# Patient Record
Sex: FEMALE | Race: WHITE | NOT HISPANIC OR LATINO
[De-identification: names, ages, dates, MRNs, and addresses within clinical notes are randomized per-mention and may not be internally consistent; named-entity substitution may affect disease eponyms.]

---

## 2019-01-23 ENCOUNTER — APPOINTMENT (OUTPATIENT)
Dept: INTERNAL MEDICINE | Facility: CLINIC | Age: 44
End: 2019-01-23

## 2019-02-25 ENCOUNTER — TRANSCRIPTION ENCOUNTER (OUTPATIENT)
Age: 44
End: 2019-02-25

## 2019-04-19 ENCOUNTER — TRANSCRIPTION ENCOUNTER (OUTPATIENT)
Age: 44
End: 2019-04-19

## 2019-05-22 ENCOUNTER — TRANSCRIPTION ENCOUNTER (OUTPATIENT)
Age: 44
End: 2019-05-22

## 2019-10-22 ENCOUNTER — TRANSCRIPTION ENCOUNTER (OUTPATIENT)
Age: 44
End: 2019-10-22

## 2020-09-29 ENCOUNTER — TRANSCRIPTION ENCOUNTER (OUTPATIENT)
Age: 45
End: 2020-09-29

## 2020-10-27 ENCOUNTER — TRANSCRIPTION ENCOUNTER (OUTPATIENT)
Age: 45
End: 2020-10-27

## 2020-10-30 ENCOUNTER — APPOINTMENT (OUTPATIENT)
Dept: INTERNAL MEDICINE | Facility: CLINIC | Age: 45
End: 2020-10-30

## 2020-11-25 ENCOUNTER — APPOINTMENT (OUTPATIENT)
Dept: INTERNAL MEDICINE | Facility: CLINIC | Age: 45
End: 2020-11-25
Payer: COMMERCIAL

## 2020-11-25 ENCOUNTER — NON-APPOINTMENT (OUTPATIENT)
Age: 45
End: 2020-11-25

## 2020-11-25 VITALS
SYSTOLIC BLOOD PRESSURE: 132 MMHG | WEIGHT: 253 LBS | OXYGEN SATURATION: 98 % | HEIGHT: 68 IN | DIASTOLIC BLOOD PRESSURE: 90 MMHG | TEMPERATURE: 97.8 F | HEART RATE: 84 BPM | BODY MASS INDEX: 38.34 KG/M2

## 2020-11-25 DIAGNOSIS — Z11.59 ENCOUNTER FOR SCREENING FOR OTHER VIRAL DISEASES: ICD-10-CM

## 2020-11-25 DIAGNOSIS — Z97.5 PRESENCE OF (INTRAUTERINE) CONTRACEPTIVE DEVICE: ICD-10-CM

## 2020-11-25 DIAGNOSIS — Z63.5 DISRUPTION OF FAMILY BY SEPARATION AND DIVORCE: ICD-10-CM

## 2020-11-25 DIAGNOSIS — Z13.1 ENCOUNTER FOR SCREENING FOR DIABETES MELLITUS: ICD-10-CM

## 2020-11-25 DIAGNOSIS — Z13.0 ENCOUNTER FOR SCREENING FOR DISEASES OF THE BLOOD AND BLOOD-FORMING ORGANS AND CERTAIN DISORDERS INVOLVING THE IMMUNE MECHANISM: ICD-10-CM

## 2020-11-25 DIAGNOSIS — Z13.220 ENCOUNTER FOR SCREENING FOR LIPOID DISORDERS: ICD-10-CM

## 2020-11-25 DIAGNOSIS — Z13.21 ENCOUNTER FOR SCREENING FOR NUTRITIONAL DISORDER: ICD-10-CM

## 2020-11-25 DIAGNOSIS — Z11.4 ENCOUNTER FOR SCREENING FOR HUMAN IMMUNODEFICIENCY VIRUS [HIV]: ICD-10-CM

## 2020-11-25 PROCEDURE — G0442 ANNUAL ALCOHOL SCREEN 15 MIN: CPT

## 2020-11-25 PROCEDURE — G0447 BEHAVIOR COUNSEL OBESITY 15M: CPT

## 2020-11-25 PROCEDURE — 90715 TDAP VACCINE 7 YRS/> IM: CPT

## 2020-11-25 PROCEDURE — 99386 PREV VISIT NEW AGE 40-64: CPT | Mod: 25

## 2020-11-25 PROCEDURE — 93000 ELECTROCARDIOGRAM COMPLETE: CPT | Mod: 59

## 2020-11-25 PROCEDURE — 99214 OFFICE O/P EST MOD 30 MIN: CPT | Mod: 25

## 2020-11-25 PROCEDURE — 36415 COLL VENOUS BLD VENIPUNCTURE: CPT

## 2020-11-25 PROCEDURE — G0444 DEPRESSION SCREEN ANNUAL: CPT

## 2020-11-25 PROCEDURE — 90471 IMMUNIZATION ADMIN: CPT

## 2020-11-25 RX ORDER — LEVONORGESTREL 52 MG/1
INTRAUTERINE DEVICE INTRAUTERINE
Refills: 0 | Status: ACTIVE | COMMUNITY

## 2020-11-25 SDOH — SOCIAL STABILITY - SOCIAL INSECURITY: DISRUPTION OF FAMILY BY SEPARATION AND DIVORCE: Z63.5

## 2020-11-25 NOTE — ASSESSMENT
[FreeTextEntry1] : Plan as above.\par \par The patient was advised to call for any problems or questions.\par \par We will make contact and follow-up after the patient's Cardiologic evaluation, which she will arrange with her father's cardiologist.

## 2020-11-25 NOTE — PHYSICAL EXAM
[No Acute Distress] : no acute distress [Well Nourished] : well nourished [Well Developed] : well developed [Well-Appearing] : well-appearing [Normal Sclera/Conjunctiva] : normal sclera/conjunctiva [EOMI] : extraocular movements intact [Normal Outer Ear/Nose] : the outer ears and nose were normal in appearance [No JVD] : no jugular venous distention [No Lymphadenopathy] : no lymphadenopathy [Supple] : supple [Thyroid Normal, No Nodules] : the thyroid was normal and there were no nodules present [No Respiratory Distress] : no respiratory distress  [No Accessory Muscle Use] : no accessory muscle use [Clear to Auscultation] : lungs were clear to auscultation bilaterally [Normal Percussion] : the chest was normal to percussion [Normal Rate] : normal rate  [Regular Rhythm] : with a regular rhythm [Normal S1, S2] : normal S1 and S2 [No Murmur] : no murmur heard [No Varicosities] : no varicosities [Pedal Pulses Present] : the pedal pulses are present [No Edema] : there was no peripheral edema [No Palpable Aorta] : no palpable aorta [No Extremity Clubbing/Cyanosis] : no extremity clubbing/cyanosis [Normal Appearance] : normal in appearance [No Nipple Discharge] : no nipple discharge [No Axillary Lymphadenopathy] : no axillary lymphadenopathy [Soft] : abdomen soft [Non Tender] : non-tender [Non-distended] : non-distended [No Masses] : no abdominal mass palpated [No HSM] : no HSM [Normal Bowel Sounds] : normal bowel sounds [No Hernias] : no hernias [Normal Supraclavicular Nodes] : no supraclavicular lymphadenopathy [Normal Axillary Nodes] : no axillary lymphadenopathy [Normal Posterior Cervical Nodes] : no posterior cervical lymphadenopathy [Normal Anterior Cervical Nodes] : no anterior cervical lymphadenopathy [Normal Inguinal Nodes] : no inguinal lymphadenopathy [Normal Femoral Nodes] : no femoral lymphadenopathy [No CVA Tenderness] : no CVA  tenderness [No Spinal Tenderness] : no spinal tenderness [No Joint Swelling] : no joint swelling [Grossly Normal Strength/Tone] : grossly normal strength/tone [No Rash] : no rash [Coordination Grossly Intact] : coordination grossly intact [No Focal Deficits] : no focal deficits [Normal Gait] : normal gait [Speech Grossly Normal] : speech grossly normal [Memory Grossly Normal] : memory grossly normal [Normal Affect] : the affect was normal [Alert and Oriented x3] : oriented to person, place, and time [Normal Mood] : the mood was normal [Normal Insight/Judgement] : insight and judgment were intact [de-identified] : Tattoo left wrist.  Resolving ecchymosis left shin.

## 2020-11-25 NOTE — HISTORY OF PRESENT ILLNESS
[FreeTextEntry1] : 45-year-old female here to establish care, for annual comprehensive evaluation and because of concerns about high blood pressure. [de-identified] : The patient is concerned about her blood pressure, which in the past has been in the high range or actually elevated.  She has attributed this in the past to anxiety, for which she is taking escitalopram with good effect.  She is under a lot of stress because she is a single mother whose father was recently diagnosed with brain cancer and his mother-in-law was also recently diagnosed with cancer.  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  She lives with her 10-year-old son and neither of them has had any coronavirus symptoms since the beginning of the pandemic.

## 2020-11-25 NOTE — HEALTH RISK ASSESSMENT
[0-5] : 0-5 [No] : In the past 12 months have you used drugs other than those required for medical reasons? No [No falls in past year] : Patient reported no falls in the past year [0] : 2) Feeling down, depressed, or hopeless: Not at all (0) [Patient reported mammogram was normal] : Patient reported mammogram was normal [Patient reported PAP Smear was normal] : Patient reported PAP Smear was normal [HIV Test offered] : HIV Test offered [Hepatitis C test offered] : Hepatitis C test offered [Fully functional (bathing, dressing, toileting, transferring, walking, feeding)] : Fully functional (bathing, dressing, toileting, transferring, walking, feeding) [Fully functional (using the telephone, shopping, preparing meals, housekeeping, doing laundry, using] : Fully functional and needs no help or supervision to perform IADLs (using the telephone, shopping, preparing meals, housekeeping, doing laundry, using transportation, managing medications and managing finances) [] : No [Audit-CScore] : 0 [[id number]] : 0 [MammogramDate] : 12/19 [PapSmearDate] : 12/19

## 2020-11-27 LAB
25(OH)D3 SERPL-MCNC: 17 NG/ML
ALBUMIN SERPL ELPH-MCNC: 4.2 G/DL
ALP BLD-CCNC: 128 U/L
ALT SERPL-CCNC: 9 U/L
ANION GAP SERPL CALC-SCNC: 8 MMOL/L
AST SERPL-CCNC: 13 U/L
BASOPHILS # BLD AUTO: 0.05 K/UL
BASOPHILS NFR BLD AUTO: 0.7 %
BILIRUB SERPL-MCNC: 0.3 MG/DL
BUN SERPL-MCNC: 10 MG/DL
CALCIUM SERPL-MCNC: 9.6 MG/DL
CHLORIDE SERPL-SCNC: 103 MMOL/L
CHOLEST SERPL-MCNC: 177 MG/DL
CO2 SERPL-SCNC: 28 MMOL/L
CREAT SERPL-MCNC: 0.73 MG/DL
EOSINOPHIL # BLD AUTO: 0.09 K/UL
EOSINOPHIL NFR BLD AUTO: 1.3 %
ESTIMATED AVERAGE GLUCOSE: 114 MG/DL
GLUCOSE SERPL-MCNC: 88 MG/DL
HBA1C MFR BLD HPLC: 5.6 %
HCT VFR BLD CALC: 36.8 %
HCV AB SER QL: NONREACTIVE
HCV S/CO RATIO: 0.11 S/CO
HDLC SERPL-MCNC: 41 MG/DL
HGB BLD-MCNC: 11 G/DL
HIV1+2 AB SPEC QL IA.RAPID: NONREACTIVE
IMM GRANULOCYTES NFR BLD AUTO: 0.1 %
IRON SATN MFR SERPL: 17 %
IRON SERPL-MCNC: 64 UG/DL
LDLC SERPL CALC-MCNC: 115 MG/DL
LYMPHOCYTES # BLD AUTO: 1.87 K/UL
LYMPHOCYTES NFR BLD AUTO: 26.2 %
MAN DIFF?: NORMAL
MCHC RBC-ENTMCNC: 26.1 PG
MCHC RBC-ENTMCNC: 29.9 GM/DL
MCV RBC AUTO: 87.2 FL
MONOCYTES # BLD AUTO: 0.4 K/UL
MONOCYTES NFR BLD AUTO: 5.6 %
NEUTROPHILS # BLD AUTO: 4.72 K/UL
NEUTROPHILS NFR BLD AUTO: 66.1 %
NONHDLC SERPL-MCNC: 135 MG/DL
PLATELET # BLD AUTO: 327 K/UL
POTASSIUM SERPL-SCNC: 3.9 MMOL/L
PROT SERPL-MCNC: 7.3 G/DL
RBC # BLD: 4.22 M/UL
RBC # FLD: 14 %
SARS-COV-2 IGG SERPL IA-ACNC: 0.08 INDEX
SARS-COV-2 IGG SERPL QL IA: NEGATIVE
SODIUM SERPL-SCNC: 138 MMOL/L
TIBC SERPL-MCNC: 370 UG/DL
TRIGL SERPL-MCNC: 102 MG/DL
TSH SERPL-ACNC: 0.81 UIU/ML
UIBC SERPL-MCNC: 306 UG/DL
WBC # FLD AUTO: 7.14 K/UL

## 2020-12-03 ENCOUNTER — TRANSCRIPTION ENCOUNTER (OUTPATIENT)
Age: 45
End: 2020-12-03

## 2020-12-08 ENCOUNTER — APPOINTMENT (OUTPATIENT)
Dept: CARDIOLOGY | Facility: CLINIC | Age: 45
End: 2020-12-08
Payer: COMMERCIAL

## 2020-12-08 ENCOUNTER — NON-APPOINTMENT (OUTPATIENT)
Age: 45
End: 2020-12-08

## 2020-12-08 VITALS
WEIGHT: 253 LBS | SYSTOLIC BLOOD PRESSURE: 130 MMHG | BODY MASS INDEX: 38.34 KG/M2 | HEIGHT: 68 IN | DIASTOLIC BLOOD PRESSURE: 82 MMHG | HEART RATE: 67 BPM

## 2020-12-08 DIAGNOSIS — R00.0 TACHYCARDIA, UNSPECIFIED: ICD-10-CM

## 2020-12-08 PROCEDURE — 99244 OFF/OP CNSLTJ NEW/EST MOD 40: CPT

## 2020-12-08 PROCEDURE — 99072 ADDL SUPL MATRL&STAF TM PHE: CPT

## 2020-12-08 PROCEDURE — 93000 ELECTROCARDIOGRAM COMPLETE: CPT

## 2020-12-08 NOTE — ASSESSMENT
[FreeTextEntry1] : 46 y/o F with borderline HTN\par \par Recommend Antianxiety medication\par No obvious indication for beta blockers at this time\par Blood pressure diary\par Will suggest 2-D echocardiogram if [facility] will start antihypertensive medications\par Recommended chemical stress test to evaluate for ischemia as well as peak blood pressures\par Followup in one month

## 2020-12-08 NOTE — HISTORY OF PRESENT ILLNESS
[FreeTextEntry1] : 45-year-old female with significant anxiety in her life secondary to a father who is ill as well as stress of school presents with borderline hypertension\par Patient denies chest pain but has intermittent shortness of breath on exertion. She denies history of myocardial infarction, stroke, coronary intervention, arrhythmia. She denies any syncope or palpitations.She is on Lexapro for anxiety

## 2020-12-08 NOTE — PHYSICAL EXAM
[General Appearance - Well Developed] : well developed [Normal Appearance] : normal appearance [Well Groomed] : well groomed [General Appearance - Well Nourished] : well nourished [No Deformities] : no deformities [General Appearance - In No Acute Distress] : no acute distress [Normal Conjunctiva] : the conjunctiva exhibited no abnormalities [Eyelids - No Xanthelasma] : the eyelids demonstrated no xanthelasmas [Normal Oral Mucosa] : normal oral mucosa [No Oral Pallor] : no oral pallor [No Oral Cyanosis] : no oral cyanosis [Normal Jugular Venous A Waves Present] : normal jugular venous A waves present [Normal Jugular Venous V Waves Present] : normal jugular venous V waves present [No Jugular Venous Brandon A Waves] : no jugular venous brandon A waves [Heart Rate And Rhythm] : heart rate and rhythm were normal [Heart Sounds] : normal S1 and S2 [Murmurs] : no murmurs present [Respiration, Rhythm And Depth] : normal respiratory rhythm and effort [Exaggerated Use Of Accessory Muscles For Inspiration] : no accessory muscle use [Auscultation Breath Sounds / Voice Sounds] : lungs were clear to auscultation bilaterally [Abdomen Soft] : soft [Abdomen Tenderness] : non-tender [Abdomen Mass (___ Cm)] : no abdominal mass palpated [Abnormal Walk] : normal gait [Gait - Sufficient For Exercise Testing] : the gait was sufficient for exercise testing [Nail Clubbing] : no clubbing of the fingernails [Cyanosis, Localized] : no localized cyanosis [Petechial Hemorrhages (___cm)] : no petechial hemorrhages [Skin Color & Pigmentation] : normal skin color and pigmentation [] : no rash [No Venous Stasis] : no venous stasis [Skin Lesions] : no skin lesions [No Skin Ulcers] : no skin ulcer [No Xanthoma] : no  xanthoma was observed [Oriented To Time, Place, And Person] : oriented to person, place, and time [Affect] : the affect was normal [Mood] : the mood was normal [No Anxiety] : not feeling anxious

## 2020-12-23 ENCOUNTER — NON-APPOINTMENT (OUTPATIENT)
Age: 45
End: 2020-12-23

## 2021-01-05 ENCOUNTER — RESULT REVIEW (OUTPATIENT)
Age: 46
End: 2021-01-05

## 2021-02-02 ENCOUNTER — APPOINTMENT (OUTPATIENT)
Dept: CARDIOLOGY | Facility: CLINIC | Age: 46
End: 2021-02-02
Payer: COMMERCIAL

## 2021-02-02 PROCEDURE — 99213 OFFICE O/P EST LOW 20 MIN: CPT | Mod: 95

## 2021-02-02 NOTE — HISTORY OF PRESENT ILLNESS
[FreeTextEntry1] : 45-year-old female with significant anxiety in her life secondary to a father who is ill as well as stress of school presents with borderline hypertension\par Patient denies chest pain but has intermittent shortness of breath on exertion. She denies history of myocardial infarction, stroke, coronary intervention, arrhythmia. She denies any syncope or palpitations.She is on Lexapro for anxiety\par \par Since last visit - no new complaints echo and stress normal.

## 2021-02-02 NOTE — ASSESSMENT
[FreeTextEntry1] : 44 y/o F with borderline HTN\par \par Recommend Antianxiety medication\par No obvious indication for beta blockers at this time\par Blood pressure diary\par 2d echo normal EF and wall thickness\par TST - no ischemia based on ST response\par Followup in 6-12 months\par \par Followup patient\par Reason patient request contact:\par Conference took place on video conference on [employer]\par Consent was obtained from patient\par Time spent: 25 minutes > 50% of the time in the encounter in both counseling and coordination of care for any or all of the diagnosis submitted\par \par

## 2021-02-02 NOTE — PHYSICAL EXAM
[General Appearance - Well Developed] : well developed [Normal Appearance] : normal appearance [Well Groomed] : well groomed [General Appearance - Well Nourished] : well nourished [No Deformities] : no deformities [General Appearance - In No Acute Distress] : no acute distress [Normal Conjunctiva] : the conjunctiva exhibited no abnormalities [Eyelids - No Xanthelasma] : the eyelids demonstrated no xanthelasmas [Normal Oral Mucosa] : normal oral mucosa [No Oral Pallor] : no oral pallor [No Oral Cyanosis] : no oral cyanosis [Normal Jugular Venous A Waves Present] : normal jugular venous A waves present [Normal Jugular Venous V Waves Present] : normal jugular venous V waves present [No Jugular Venous Brandon A Waves] : no jugular venous brandon A waves [Respiration, Rhythm And Depth] : normal respiratory rhythm and effort [Exaggerated Use Of Accessory Muscles For Inspiration] : no accessory muscle use [Auscultation Breath Sounds / Voice Sounds] : lungs were clear to auscultation bilaterally [Heart Rate And Rhythm] : heart rate and rhythm were normal [Heart Sounds] : normal S1 and S2 [Murmurs] : no murmurs present [Abdomen Soft] : soft [Abdomen Tenderness] : non-tender [Abdomen Mass (___ Cm)] : no abdominal mass palpated [Abnormal Walk] : normal gait [Gait - Sufficient For Exercise Testing] : the gait was sufficient for exercise testing [Nail Clubbing] : no clubbing of the fingernails [Cyanosis, Localized] : no localized cyanosis [Petechial Hemorrhages (___cm)] : no petechial hemorrhages [] : no rash [Skin Color & Pigmentation] : normal skin color and pigmentation [No Venous Stasis] : no venous stasis [Skin Lesions] : no skin lesions [No Skin Ulcers] : no skin ulcer [No Xanthoma] : no  xanthoma was observed [Oriented To Time, Place, And Person] : oriented to person, place, and time [Affect] : the affect was normal [Mood] : the mood was normal [No Anxiety] : not feeling anxious

## 2021-03-01 ENCOUNTER — APPOINTMENT (OUTPATIENT)
Dept: INTERNAL MEDICINE | Facility: CLINIC | Age: 46
End: 2021-03-01
Payer: COMMERCIAL

## 2021-03-01 VITALS
BODY MASS INDEX: 38.01 KG/M2 | TEMPERATURE: 97.9 F | DIASTOLIC BLOOD PRESSURE: 82 MMHG | SYSTOLIC BLOOD PRESSURE: 120 MMHG | WEIGHT: 250 LBS | OXYGEN SATURATION: 98 % | HEART RATE: 87 BPM

## 2021-03-01 PROCEDURE — 99214 OFFICE O/P EST MOD 30 MIN: CPT | Mod: 25

## 2021-03-01 PROCEDURE — G0444 DEPRESSION SCREEN ANNUAL: CPT | Mod: 59

## 2021-03-01 PROCEDURE — 99072 ADDL SUPL MATRL&STAF TM PHE: CPT

## 2021-03-01 PROCEDURE — 36415 COLL VENOUS BLD VENIPUNCTURE: CPT

## 2021-03-01 RX ORDER — FLUCONAZOLE 150 MG/1
150 TABLET ORAL
Qty: 2 | Refills: 0 | Status: COMPLETED | COMMUNITY
Start: 2020-10-07

## 2021-03-01 RX ORDER — ESCITALOPRAM OXALATE 20 MG/1
20 TABLET ORAL
Qty: 90 | Refills: 1 | Status: DISCONTINUED | COMMUNITY
End: 2021-03-01

## 2021-03-01 NOTE — HISTORY OF PRESENT ILLNESS
[FreeTextEntry1] : 45-year-old female here because of depression, fatigue and insomnia. [de-identified] : The patient lost her father brain cancer in January, and has been grieving since that time without overt evidence of depression.  She has been unable to sleep and is extremely fatigued throughout the day.  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  She is able to stay awake when working, but when she has downtime she tends to fall asleep.  She has also suffered from anxiety, but this is mostly in relationship to her stepmother and her family, for which she is taking Klonopin only 2 times.  Patient has absolutely no suicidal ideation.  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  The patient has been taking escitalopram, but reports that it has not been very effective.  She had taken Zoloft in the past with better effect.

## 2021-03-01 NOTE — ASSESSMENT
[FreeTextEntry1] : Plan as above.\par \par The patient was advised to call for any problems or questions.\par \par Return visit for follow-up in 1 month, sooner if needed.

## 2021-03-01 NOTE — PHYSICAL EXAM
[Normal Sclera/Conjunctiva] : normal sclera/conjunctiva [Normal Outer Ear/Nose] : the outer ears and nose were normal in appearance [Normal Percussion] : the chest was normal to percussion [No Edema] : there was no peripheral edema [Normal Gait] : normal gait [Speech Grossly Normal] : speech grossly normal [Memory Grossly Normal] : memory grossly normal [Alert and Oriented x3] : oriented to person, place, and time [Normal Mood] : the mood was normal [Normal] : affect was normal and insight and judgment were intact

## 2021-03-01 NOTE — HEALTH RISK ASSESSMENT
[2] : 1) Little interest or pleasure doing things for more than half of the days (2) [3] : 2) Feeling down, depressed, or hopeless for nearly every day (3) [[id number]] : 5 [RHB1Nrmpx] : 15

## 2021-03-02 ENCOUNTER — TRANSCRIPTION ENCOUNTER (OUTPATIENT)
Age: 46
End: 2021-03-02

## 2021-03-02 LAB
ALBUMIN SERPL ELPH-MCNC: 4.2 G/DL
ALP BLD-CCNC: 141 U/L
ALT SERPL-CCNC: 15 U/L
ANION GAP SERPL CALC-SCNC: 13 MMOL/L
AST SERPL-CCNC: 14 U/L
BASOPHILS # BLD AUTO: 0.05 K/UL
BASOPHILS NFR BLD AUTO: 0.6 %
BILIRUB SERPL-MCNC: 0.2 MG/DL
BUN SERPL-MCNC: 11 MG/DL
CALCIUM SERPL-MCNC: 9.5 MG/DL
CHLORIDE SERPL-SCNC: 104 MMOL/L
CO2 SERPL-SCNC: 24 MMOL/L
CREAT SERPL-MCNC: 0.81 MG/DL
EOSINOPHIL # BLD AUTO: 0.13 K/UL
EOSINOPHIL NFR BLD AUTO: 1.5 %
GLUCOSE SERPL-MCNC: 93 MG/DL
HCT VFR BLD CALC: 35.8 %
HGB BLD-MCNC: 11.2 G/DL
IMM GRANULOCYTES NFR BLD AUTO: 0.1 %
IRON SATN MFR SERPL: 13 %
IRON SERPL-MCNC: 51 UG/DL
LYMPHOCYTES # BLD AUTO: 2.14 K/UL
LYMPHOCYTES NFR BLD AUTO: 24.7 %
MAN DIFF?: NORMAL
MCHC RBC-ENTMCNC: 26.5 PG
MCHC RBC-ENTMCNC: 31.3 GM/DL
MCV RBC AUTO: 84.6 FL
MONOCYTES # BLD AUTO: 0.43 K/UL
MONOCYTES NFR BLD AUTO: 5 %
NEUTROPHILS # BLD AUTO: 5.89 K/UL
NEUTROPHILS NFR BLD AUTO: 68.1 %
PLATELET # BLD AUTO: 406 K/UL
POTASSIUM SERPL-SCNC: 4 MMOL/L
PROT SERPL-MCNC: 7.6 G/DL
RBC # BLD: 4.23 M/UL
RBC # FLD: 13.9 %
SODIUM SERPL-SCNC: 141 MMOL/L
TIBC SERPL-MCNC: 404 UG/DL
TSH SERPL-ACNC: 0.93 UIU/ML
UIBC SERPL-MCNC: 352 UG/DL
WBC # FLD AUTO: 8.65 K/UL

## 2021-03-14 ENCOUNTER — TRANSCRIPTION ENCOUNTER (OUTPATIENT)
Age: 46
End: 2021-03-14

## 2021-04-01 ENCOUNTER — APPOINTMENT (OUTPATIENT)
Dept: GASTROENTEROLOGY | Facility: CLINIC | Age: 46
End: 2021-04-01
Payer: COMMERCIAL

## 2021-04-01 VITALS
WEIGHT: 250 LBS | TEMPERATURE: 97.7 F | BODY MASS INDEX: 37.89 KG/M2 | OXYGEN SATURATION: 97 % | HEIGHT: 68 IN | SYSTOLIC BLOOD PRESSURE: 112 MMHG | HEART RATE: 80 BPM | DIASTOLIC BLOOD PRESSURE: 76 MMHG

## 2021-04-01 PROCEDURE — 99244 OFF/OP CNSLTJ NEW/EST MOD 40: CPT

## 2021-04-01 PROCEDURE — 99072 ADDL SUPL MATRL&STAF TM PHE: CPT

## 2021-04-01 RX ORDER — TRAZODONE HYDROCHLORIDE 50 MG/1
50 TABLET ORAL
Qty: 30 | Refills: 3 | Status: DISCONTINUED | COMMUNITY
Start: 2021-03-01 | End: 2021-04-01

## 2021-04-01 NOTE — HISTORY OF PRESENT ILLNESS
[FreeTextEntry1] : Ms. Vences is a pleasant 45F h/o anxiety, sleeve gastrectomy 2014, HLD, migraines, obesity, appendectomy who is referred by Dr. Villanueva for iron deficiency anemia.\par \shabbir Was seen in early March by Dr. Villanueva due to feeling tired, labs at that time showed lower iron % saturation and a mildly decreased Hgb (see below).\par \par She reports no BRBPR or black stool.  Has always had an "off" stomach, she will have frequent episodes of abdominal cramping followed by fecal urgency. She had one episode where she could not make it to the bathroom and had fecal incontinence.\par \par Has never had a colonoscopy for these symptoms.\par \par No overt diarrhea, no constipation.\par \par No heartburn or regurgitation.  Has had recent N/V, non-bloody, non-bilious. She has had associated early satiety.  Has not had this before. Not related to eating, no fevers, chills.  No other complaints.\par \par Does not smoke or drink.\par \par No FHx of any GI malignancies.

## 2021-04-01 NOTE — ASSESSMENT
[FreeTextEntry1] : Will plan on an upper endoscopy and colonoscopy for iron deficiency anemia, N/V, screening.  Explained risks/benefits/alternatives including not limited to bleeding, infection, perforation, missed lesions, anesthesia complications.  Patient understands and agrees, all questions answered.  Will use a split dose miralax/gatorade prep with clears the day prior.\par \par Wants to see a new bariatric surgeon, provided office number for Dr. Osborn.  Would consider a revision with a RYGB.\par \par Thank you for referring Ms. MAST.  Please do not hesitate to call to further discuss his/her care.\par \par Note faxed to requesting MD.\par \par

## 2021-04-09 ENCOUNTER — APPOINTMENT (OUTPATIENT)
Dept: INTERNAL MEDICINE | Facility: CLINIC | Age: 46
End: 2021-04-09
Payer: COMMERCIAL

## 2021-04-09 VITALS
BODY MASS INDEX: 38.65 KG/M2 | WEIGHT: 255 LBS | RESPIRATION RATE: 16 BRPM | TEMPERATURE: 97.1 F | HEIGHT: 68 IN | SYSTOLIC BLOOD PRESSURE: 132 MMHG | DIASTOLIC BLOOD PRESSURE: 82 MMHG | HEART RATE: 88 BPM | OXYGEN SATURATION: 99 %

## 2021-04-09 DIAGNOSIS — Z87.898 PERSONAL HISTORY OF OTHER SPECIFIED CONDITIONS: ICD-10-CM

## 2021-04-09 DIAGNOSIS — E73.9 LACTOSE INTOLERANCE, UNSPECIFIED: ICD-10-CM

## 2021-04-09 PROCEDURE — 99072 ADDL SUPL MATRL&STAF TM PHE: CPT

## 2021-04-09 PROCEDURE — G0444 DEPRESSION SCREEN ANNUAL: CPT

## 2021-04-09 PROCEDURE — 99214 OFFICE O/P EST MOD 30 MIN: CPT | Mod: 25

## 2021-04-09 NOTE — ASSESSMENT
[FreeTextEntry1] : Plan as above.\par \par The patient will get her flu vaccine in September.\par \par The patient was advised to call for any problems or questions.\par \par Return visit for annual exam in December.

## 2021-04-09 NOTE — PHYSICAL EXAM
[Normal Sclera/Conjunctiva] : normal sclera/conjunctiva [Normal Outer Ear/Nose] : the outer ears and nose were normal in appearance [Normal Percussion] : the chest was normal to percussion [No Edema] : there was no peripheral edema [No Rash] : no rash [Normal Gait] : normal gait [Speech Grossly Normal] : speech grossly normal [Memory Grossly Normal] : memory grossly normal [Alert and Oriented x3] : oriented to person, place, and time [Normal Mood] : the mood was normal [Normal] : affect was normal and insight and judgment were intact

## 2021-04-29 ENCOUNTER — TRANSCRIPTION ENCOUNTER (OUTPATIENT)
Age: 46
End: 2021-04-29

## 2021-05-23 ENCOUNTER — RESULT REVIEW (OUTPATIENT)
Age: 46
End: 2021-05-23

## 2021-05-25 ENCOUNTER — RESULT REVIEW (OUTPATIENT)
Age: 46
End: 2021-05-25

## 2021-05-26 ENCOUNTER — RESULT REVIEW (OUTPATIENT)
Age: 46
End: 2021-05-26

## 2021-05-26 ENCOUNTER — APPOINTMENT (OUTPATIENT)
Dept: GASTROENTEROLOGY | Facility: HOSPITAL | Age: 46
End: 2021-05-26

## 2021-05-29 ENCOUNTER — RX RENEWAL (OUTPATIENT)
Age: 46
End: 2021-05-29

## 2021-05-29 ENCOUNTER — TRANSCRIPTION ENCOUNTER (OUTPATIENT)
Age: 46
End: 2021-05-29

## 2021-06-02 ENCOUNTER — TRANSCRIPTION ENCOUNTER (OUTPATIENT)
Age: 46
End: 2021-06-02

## 2021-06-20 ENCOUNTER — TRANSCRIPTION ENCOUNTER (OUTPATIENT)
Age: 46
End: 2021-06-20

## 2021-06-29 ENCOUNTER — APPOINTMENT (OUTPATIENT)
Dept: SURGERY | Facility: CLINIC | Age: 46
End: 2021-06-29
Payer: COMMERCIAL

## 2021-06-29 VITALS
RESPIRATION RATE: 16 BRPM | BODY MASS INDEX: 38.74 KG/M2 | SYSTOLIC BLOOD PRESSURE: 158 MMHG | HEART RATE: 71 BPM | OXYGEN SATURATION: 97 % | HEIGHT: 68.31 IN | WEIGHT: 258.6 LBS | DIASTOLIC BLOOD PRESSURE: 77 MMHG

## 2021-06-29 PROCEDURE — 99072 ADDL SUPL MATRL&STAF TM PHE: CPT

## 2021-06-29 PROCEDURE — 99204 OFFICE O/P NEW MOD 45 MIN: CPT

## 2021-06-29 NOTE — HISTORY OF PRESENT ILLNESS
[de-identified] : THOMPSON MAST is a 46 year F with severe obesity who is interested in learning weight loss options.  She originally had a sleeve gastrectomy in 2014 at [facility] with a preoperative weight of 276 lbs.  Within 6-9 months she was down to her lowest weight of 198 lbs which she maintained over several years.  Over the past couple of years, she has struggled with her weight due to multiple stressors at home.  Recently she underwent a workup for anemia which revealed a large hiatal hernia as well as H. pylori for which she completed treatment.

## 2021-06-29 NOTE — PHYSICAL EXAM
[Obese] : obese [Normal] : affect appropriate [de-identified] : well healed laparoscopic and low transverse incisions

## 2021-06-29 NOTE — PLAN
[FreeTextEntry1] : 45 yo female s/p lap sleeve with weight regain\par -explained to patient that she should begin with nutritional counseling to evaluate current behavior in order to get back on track (packet provided)\par -may consider medication in addition if indicated, as well as possibility of switching to Wellbutrin for added benefit\par -discussed surgical options with patient including conversion to gastric bypass and duodenal switch, both with repair of hiatal hernia\par -patient would like to attempt nonsurgical options first

## 2021-06-30 ENCOUNTER — TRANSCRIPTION ENCOUNTER (OUTPATIENT)
Age: 46
End: 2021-06-30

## 2021-07-07 ENCOUNTER — APPOINTMENT (OUTPATIENT)
Dept: INTERNAL MEDICINE | Facility: CLINIC | Age: 46
End: 2021-07-07
Payer: COMMERCIAL

## 2021-07-07 VITALS
TEMPERATURE: 97.3 F | HEART RATE: 78 BPM | OXYGEN SATURATION: 98 % | HEIGHT: 68 IN | SYSTOLIC BLOOD PRESSURE: 118 MMHG | WEIGHT: 255 LBS | DIASTOLIC BLOOD PRESSURE: 82 MMHG | BODY MASS INDEX: 38.65 KG/M2

## 2021-07-07 DIAGNOSIS — J45.909 UNSPECIFIED ASTHMA, UNCOMPLICATED: ICD-10-CM

## 2021-07-07 PROCEDURE — 99214 OFFICE O/P EST MOD 30 MIN: CPT

## 2021-07-07 PROCEDURE — 99072 ADDL SUPL MATRL&STAF TM PHE: CPT

## 2021-07-07 RX ORDER — COLD-HOT PACK
125 MCG EACH MISCELLANEOUS DAILY
Qty: 90 | Refills: 1 | Status: ACTIVE | COMMUNITY
Start: 2020-11-27

## 2021-07-07 RX ORDER — AMOXICILLIN 500 MG/1
500 TABLET, FILM COATED ORAL
Qty: 56 | Refills: 0 | Status: DISCONTINUED | COMMUNITY
Start: 2021-05-28 | End: 2021-07-07

## 2021-07-07 RX ORDER — CLARITHROMYCIN 500 MG/1
500 TABLET, FILM COATED ORAL
Qty: 28 | Refills: 0 | Status: DISCONTINUED | COMMUNITY
Start: 2021-05-28 | End: 2021-07-07

## 2021-07-07 RX ORDER — PANTOPRAZOLE 20 MG/1
20 TABLET, DELAYED RELEASE ORAL TWICE DAILY
Qty: 28 | Refills: 0 | Status: DISCONTINUED | COMMUNITY
Start: 2021-05-28 | End: 2021-07-07

## 2021-07-07 NOTE — HISTORY OF PRESENT ILLNESS
[FreeTextEntry1] : 46-year-old female here for follow-up of anxiety, but also with a new complaint of a cough for the past 2 weeks. [de-identified] : The patient was recently treated for H. pylori, discovered during an EGD.  She reports that she has had no change in her symptoms after taking the medications, but did develop a yeast infection from the antibiotics.  She continues to have GERD symptoms, but only occasionally and they do not seem to be very serious or bothersome.\par The patient is continue taking her sertraline with very good effect.  She reports that her anxiety is exceptionally well controlled with this medication.  Her bariatric surgeon suggested that Wellbutrin might be a better choice in terms of controlling her weight, but the patient has been able to lose a few pounds while taking the sertraline.  She prefers to lose weight through diet and exercise and would like to avoid further bariatric surgery if possible.\par Several weeks ago, the patient suffered a URI with cough and congestion.  Most of the symptoms and the congestion cleared up, but the patient has been left with a persistent, dry cough.  She has been told by others that her cough sounds wheezy and is if it is from asthma.  The patient has had asthma in the past, related to exercise.

## 2021-07-07 NOTE — ASSESSMENT
[FreeTextEntry1] : Plan as above.\par \par The patient was advised to call for any problems or questions.\par \par Return visit in September for follow-up and flu/Pneumovax vaccines.

## 2021-07-07 NOTE — PHYSICAL EXAM
[Normal Sclera/Conjunctiva] : normal sclera/conjunctiva [Normal Outer Ear/Nose] : the outer ears and nose were normal in appearance [Normal Percussion] : the chest was normal to percussion [No Edema] : there was no peripheral edema [No Rash] : no rash [Normal Gait] : normal gait [Normal] : affect was normal and insight and judgment were intact [de-identified] : Clear to auscultation, but slight expiratory wheeze with coughing.

## 2021-07-12 ENCOUNTER — TRANSCRIPTION ENCOUNTER (OUTPATIENT)
Age: 46
End: 2021-07-12

## 2021-08-30 ENCOUNTER — RX CHANGE (OUTPATIENT)
Age: 46
End: 2021-08-30

## 2021-09-02 ENCOUNTER — RX CHANGE (OUTPATIENT)
Age: 46
End: 2021-09-02

## 2021-09-07 ENCOUNTER — TRANSCRIPTION ENCOUNTER (OUTPATIENT)
Age: 46
End: 2021-09-07

## 2021-09-20 ENCOUNTER — TRANSCRIPTION ENCOUNTER (OUTPATIENT)
Age: 46
End: 2021-09-20

## 2021-10-06 ENCOUNTER — TRANSCRIPTION ENCOUNTER (OUTPATIENT)
Age: 46
End: 2021-10-06

## 2021-10-26 ENCOUNTER — RX RENEWAL (OUTPATIENT)
Age: 46
End: 2021-10-26

## 2021-11-24 ENCOUNTER — APPOINTMENT (OUTPATIENT)
Dept: INTERNAL MEDICINE | Facility: CLINIC | Age: 46
End: 2021-11-24
Payer: COMMERCIAL

## 2021-11-24 ENCOUNTER — NON-APPOINTMENT (OUTPATIENT)
Age: 46
End: 2021-11-24

## 2021-11-24 VITALS
HEART RATE: 76 BPM | WEIGHT: 252 LBS | TEMPERATURE: 97.2 F | OXYGEN SATURATION: 99 % | DIASTOLIC BLOOD PRESSURE: 86 MMHG | SYSTOLIC BLOOD PRESSURE: 124 MMHG | BODY MASS INDEX: 38.19 KG/M2 | RESPIRATION RATE: 16 BRPM | HEIGHT: 68 IN

## 2021-11-24 DIAGNOSIS — Z86.19 PERSONAL HISTORY OF OTHER INFECTIOUS AND PARASITIC DISEASES: ICD-10-CM

## 2021-11-24 DIAGNOSIS — Z87.898 PERSONAL HISTORY OF OTHER SPECIFIED CONDITIONS: ICD-10-CM

## 2021-11-24 DIAGNOSIS — Z23 ENCOUNTER FOR IMMUNIZATION: ICD-10-CM

## 2021-11-24 DIAGNOSIS — B37.9 CANDIDIASIS, UNSPECIFIED: ICD-10-CM

## 2021-11-24 DIAGNOSIS — G47.00 INSOMNIA, UNSPECIFIED: ICD-10-CM

## 2021-11-24 PROCEDURE — 99396 PREV VISIT EST AGE 40-64: CPT | Mod: 25

## 2021-11-24 PROCEDURE — G0442 ANNUAL ALCOHOL SCREEN 15 MIN: CPT

## 2021-11-24 PROCEDURE — 93000 ELECTROCARDIOGRAM COMPLETE: CPT | Mod: 59

## 2021-11-24 PROCEDURE — G0447 BEHAVIOR COUNSEL OBESITY 15M: CPT | Mod: 59

## 2021-11-24 PROCEDURE — G0444 DEPRESSION SCREEN ANNUAL: CPT | Mod: 59

## 2021-11-24 PROCEDURE — 99214 OFFICE O/P EST MOD 30 MIN: CPT | Mod: 25

## 2021-11-24 PROCEDURE — 36415 COLL VENOUS BLD VENIPUNCTURE: CPT

## 2021-11-24 RX ORDER — PANTOPRAZOLE 40 MG/1
40 TABLET, DELAYED RELEASE ORAL
Qty: 60 | Refills: 1 | Status: DISCONTINUED | COMMUNITY
Start: 2021-07-16 | End: 2021-11-24

## 2021-11-24 RX ORDER — FLUCONAZOLE 150 MG/1
150 TABLET ORAL
Qty: 1 | Refills: 1 | Status: DISCONTINUED | COMMUNITY
Start: 2021-06-02 | End: 2021-11-24

## 2021-11-24 RX ORDER — SERTRALINE 25 MG/1
25 TABLET, FILM COATED ORAL DAILY
Qty: 90 | Refills: 1 | Status: DISCONTINUED | COMMUNITY
Start: 2021-03-01 | End: 2021-11-24

## 2021-11-24 NOTE — HEALTH RISK ASSESSMENT
[0-4] : 0-4 [No] : In the past 12 months have you used drugs other than those required for medical reasons? No [No falls in past year] : Patient reported no falls in the past year [1] : 2) Feeling down, depressed, or hopeless for several days (1) [PHQ-9 Positive] : PHQ-9 Positive [I have developed a follow-up plan documented below in the note.] : I have developed a follow-up plan documented below in the note. [Fully functional (bathing, dressing, toileting, transferring, walking, feeding)] : Fully functional (bathing, dressing, toileting, transferring, walking, feeding) [Fully functional (using the telephone, shopping, preparing meals, housekeeping, doing laundry, using] : Fully functional and needs no help or supervision to perform IADLs (using the telephone, shopping, preparing meals, housekeeping, doing laundry, using transportation, managing medications and managing finances) [] : No [Audit-CScore] : 0 [[id number]] : 2

## 2021-11-24 NOTE — ASSESSMENT
[FreeTextEntry1] : Plan as above.\par \par The patient will seriously consider taking the Covid vaccine at a time when she does not have to report to work for several days afterwards.\par \par The patient was advised to call for any problems or questions.\par \par Return visit for follow-up in 3 months.

## 2021-11-24 NOTE — HISTORY OF PRESENT ILLNESS
[FreeTextEntry1] : 46-year-old female here for her annual comprehensive evaluation and follow-up of several medical issues. [de-identified] : The patient reports that her anxiety seems to be getting worse, although her depression has been relatively stable, improved on the medication but not totally resolved.  She has had no suicidal ideation.  She does report that she snores a great deal at night, especially when sleeping on her back.  She also awakens throughout the night with a choking sensation and coughing.\par The patient's asthma has been stable and well-controlled with her use of Advair.\par The patient has been seen by bariatric surgeon and referred to a nutritionist.  Unfortunately, she has been having a difficult time arranging an appointment with the nutritionist.

## 2021-11-24 NOTE — PHYSICAL EXAM
[No Acute Distress] : no acute distress [Well Nourished] : well nourished [Well Developed] : well developed [Well-Appearing] : well-appearing [Normal Sclera/Conjunctiva] : normal sclera/conjunctiva [EOMI] : extraocular movements intact [Normal Outer Ear/Nose] : the outer ears and nose were normal in appearance [No JVD] : no jugular venous distention [No Lymphadenopathy] : no lymphadenopathy [Supple] : supple [Thyroid Normal, No Nodules] : the thyroid was normal and there were no nodules present [No Respiratory Distress] : no respiratory distress  [No Accessory Muscle Use] : no accessory muscle use [Clear to Auscultation] : lungs were clear to auscultation bilaterally [Normal Percussion] : the chest was normal to percussion [Normal Rate] : normal rate  [Regular Rhythm] : with a regular rhythm [Normal S1, S2] : normal S1 and S2 [No Murmur] : no murmur heard [No Varicosities] : no varicosities [Pedal Pulses Present] : the pedal pulses are present [No Edema] : there was no peripheral edema [No Palpable Aorta] : no palpable aorta [No Extremity Clubbing/Cyanosis] : no extremity clubbing/cyanosis [Declined Breast Exam] : declined breast exam  [Soft] : abdomen soft [Non Tender] : non-tender [Non-distended] : non-distended [No Masses] : no abdominal mass palpated [No HSM] : no HSM [Normal Bowel Sounds] : normal bowel sounds [No Hernias] : no hernias [Normal Supraclavicular Nodes] : no supraclavicular lymphadenopathy [Normal Posterior Cervical Nodes] : no posterior cervical lymphadenopathy [Normal Anterior Cervical Nodes] : no anterior cervical lymphadenopathy [Normal Inguinal Nodes] : no inguinal lymphadenopathy [Normal Femoral Nodes] : no femoral lymphadenopathy [No CVA Tenderness] : no CVA  tenderness [No Spinal Tenderness] : no spinal tenderness [No Joint Swelling] : no joint swelling [Grossly Normal Strength/Tone] : grossly normal strength/tone [No Rash] : no rash [Coordination Grossly Intact] : coordination grossly intact [No Focal Deficits] : no focal deficits [Normal Gait] : normal gait [Speech Grossly Normal] : speech grossly normal [Memory Grossly Normal] : memory grossly normal [Normal Affect] : the affect was normal [Alert and Oriented x3] : oriented to person, place, and time [Normal Mood] : the mood was normal [Normal Insight/Judgement] : insight and judgment were intact [de-identified] : Done recently by gyn. [de-identified] : Nevi, some atypical.

## 2021-11-25 LAB
25(OH)D3 SERPL-MCNC: 25.2 NG/ML
ALBUMIN SERPL ELPH-MCNC: 4.1 G/DL
ALP BLD-CCNC: 124 U/L
ALT SERPL-CCNC: 10 U/L
ANION GAP SERPL CALC-SCNC: 12 MMOL/L
AST SERPL-CCNC: 12 U/L
BASOPHILS # BLD AUTO: 0.07 K/UL
BASOPHILS NFR BLD AUTO: 0.8 %
BILIRUB SERPL-MCNC: 0.2 MG/DL
BUN SERPL-MCNC: 9 MG/DL
CALCIUM SERPL-MCNC: 9.3 MG/DL
CHLORIDE SERPL-SCNC: 104 MMOL/L
CHOLEST SERPL-MCNC: 192 MG/DL
CO2 SERPL-SCNC: 25 MMOL/L
CREAT SERPL-MCNC: 0.72 MG/DL
EOSINOPHIL # BLD AUTO: 0.14 K/UL
EOSINOPHIL NFR BLD AUTO: 1.5 %
ESTIMATED AVERAGE GLUCOSE: 117 MG/DL
GLUCOSE SERPL-MCNC: 88 MG/DL
HBA1C MFR BLD HPLC: 5.7 %
HCT VFR BLD CALC: 33.5 %
HDLC SERPL-MCNC: 40 MG/DL
HGB BLD-MCNC: 10.6 G/DL
IMM GRANULOCYTES NFR BLD AUTO: 0.3 %
IRON SATN MFR SERPL: 12 %
IRON SERPL-MCNC: 44 UG/DL
LDLC SERPL CALC-MCNC: 124 MG/DL
LYMPHOCYTES # BLD AUTO: 2.35 K/UL
LYMPHOCYTES NFR BLD AUTO: 25.8 %
MAN DIFF?: NORMAL
MCHC RBC-ENTMCNC: 26.2 PG
MCHC RBC-ENTMCNC: 31.6 GM/DL
MCV RBC AUTO: 82.9 FL
MONOCYTES # BLD AUTO: 0.52 K/UL
MONOCYTES NFR BLD AUTO: 5.7 %
NEUTROPHILS # BLD AUTO: 6 K/UL
NEUTROPHILS NFR BLD AUTO: 65.9 %
NONHDLC SERPL-MCNC: 152 MG/DL
PLATELET # BLD AUTO: 368 K/UL
POTASSIUM SERPL-SCNC: 3.6 MMOL/L
PROT SERPL-MCNC: 7.1 G/DL
RBC # BLD: 4.04 M/UL
RBC # FLD: 14.4 %
SODIUM SERPL-SCNC: 141 MMOL/L
TIBC SERPL-MCNC: 375 UG/DL
TRIGL SERPL-MCNC: 144 MG/DL
TSH SERPL-ACNC: 0.95 UIU/ML
UIBC SERPL-MCNC: 331 UG/DL
WBC # FLD AUTO: 9.11 K/UL

## 2021-12-15 ENCOUNTER — APPOINTMENT (OUTPATIENT)
Dept: PULMONOLOGY | Facility: CLINIC | Age: 46
End: 2021-12-15
Payer: COMMERCIAL

## 2021-12-15 ENCOUNTER — NON-APPOINTMENT (OUTPATIENT)
Age: 46
End: 2021-12-15

## 2021-12-15 VITALS
HEIGHT: 68 IN | WEIGHT: 252 LBS | TEMPERATURE: 96.5 F | DIASTOLIC BLOOD PRESSURE: 85 MMHG | BODY MASS INDEX: 38.19 KG/M2 | SYSTOLIC BLOOD PRESSURE: 130 MMHG | HEART RATE: 79 BPM | OXYGEN SATURATION: 99 %

## 2021-12-15 DIAGNOSIS — R06.00 DYSPNEA, UNSPECIFIED: ICD-10-CM

## 2021-12-15 PROCEDURE — 99204 OFFICE O/P NEW MOD 45 MIN: CPT

## 2021-12-15 NOTE — HISTORY OF PRESENT ILLNESS
[FreeTextEntry1] : evaluation for possible sleep apnea and chronic dyspnea [de-identified] : This is a 46-year-old female nonsmoker who was told of possible asthma several months ago when she presented with shortness of breath intermittent cough. She was then placed on Advair. The end of September she had an illness which lasted about a week when she was very fatigued and slept a lot. She had a negative covid test x3. Since that time she complains of feeling lightheaded with headaches. She also gets short of breath with wearing the mask. Shortness of breath occurs at rest. She has moderate dry cough. She is not wheezing. She has vague chest discomfort with coughing. She remains on Advair 250/50 b.i.d. but is not helping. She states she gets short of breath if she is rushing a lot. There is no history of childhood asthma or known allergies. Her main symptom is shortness of breath which occurs even at rest. She is worse when she wears a mask. She also has sleep complaints. Her bedtime is 10:30 and she awakens at 6:45 AM. She awakens 3-4 times per night for short periods of time. She is known to have loud snoring and witnessed that he has. She will wake up gasping for air or 2 times per week. She feels sleepy during the day. [place] score of 13. She is 5 feet 9 weight 250 pounds. She has gained 50 pounds slowly over the past 5 years. She is status post gastric bypass surgery in 2014. Past medical history significant for anxiety and depression on Wellbutrin.

## 2021-12-15 NOTE — ASSESSMENT
[FreeTextEntry1] : Patient with atypical symptoms of shortness of breath. I am not convinced we are dealing with asthma. I feel patient should stop Advair and be monitored. She will require a pulmonary function test off Advair. She may use albuterol inhaler on a p.r.n. basis. I also believe this patient requires a sleep study for evaluation of probable sleep apnea. It is unclear how much of her current complaint is related to her sleep disorder. He certainly headaches, fatigue and excessive daytime sleepiness is probably related to sleep apnea.

## 2021-12-15 NOTE — REVIEW OF SYSTEMS
[Shortness Of Breath] : shortness of breath [Cough] : cough [Headache] : headache [Negative] : Heme/Lymph

## 2021-12-15 NOTE — PHYSICAL EXAM
[No Acute Distress] : no acute distress [Well Developed] : well developed [Well-Appearing] : well-appearing [Normal Sclera/Conjunctiva] : normal sclera/conjunctiva [PERRL] : pupils equal round and reactive to light [EOMI] : extraocular movements intact [Normal Outer Ear/Nose] : the outer ears and nose were normal in appearance [Normal Oropharynx] : the oropharynx was normal [No JVD] : no jugular venous distention [No Lymphadenopathy] : no lymphadenopathy [Supple] : supple [Thyroid Normal, No Nodules] : the thyroid was normal and there were no nodules present [No Respiratory Distress] : no respiratory distress  [No Accessory Muscle Use] : no accessory muscle use [Clear to Auscultation] : lungs were clear to auscultation bilaterally [Normal Rate] : normal rate  [Regular Rhythm] : with a regular rhythm [Normal S1, S2] : normal S1 and S2 [No Murmur] : no murmur heard [No Carotid Bruits] : no carotid bruits [No Abdominal Bruit] : a ~M bruit was not heard ~T in the abdomen [No Varicosities] : no varicosities [Pedal Pulses Present] : the pedal pulses are present [No Edema] : there was no peripheral edema [No Palpable Aorta] : no palpable aorta [No Extremity Clubbing/Cyanosis] : no extremity clubbing/cyanosis [Soft] : abdomen soft [Non Tender] : non-tender [Non-distended] : non-distended [No Masses] : no abdominal mass palpated [No HSM] : no HSM [Normal Bowel Sounds] : normal bowel sounds [Normal Posterior Cervical Nodes] : no posterior cervical lymphadenopathy [Normal Anterior Cervical Nodes] : no anterior cervical lymphadenopathy [No CVA Tenderness] : no CVA  tenderness [No Spinal Tenderness] : no spinal tenderness [No Joint Swelling] : no joint swelling [Grossly Normal Strength/Tone] : grossly normal strength/tone [No Rash] : no rash [Coordination Grossly Intact] : coordination grossly intact [No Focal Deficits] : no focal deficits [Normal Gait] : normal gait [Normal Affect] : the affect was normal [Normal Insight/Judgement] : insight and judgment were intact [de-identified] : obese

## 2021-12-28 ENCOUNTER — TRANSCRIPTION ENCOUNTER (OUTPATIENT)
Age: 46
End: 2021-12-28

## 2022-01-08 ENCOUNTER — RESULT REVIEW (OUTPATIENT)
Age: 47
End: 2022-01-08

## 2022-01-22 ENCOUNTER — RESULT REVIEW (OUTPATIENT)
Age: 47
End: 2022-01-22

## 2022-01-30 ENCOUNTER — RESULT REVIEW (OUTPATIENT)
Age: 47
End: 2022-01-30

## 2022-04-06 ENCOUNTER — APPOINTMENT (OUTPATIENT)
Dept: INTERNAL MEDICINE | Facility: CLINIC | Age: 47
End: 2022-04-06
Payer: COMMERCIAL

## 2022-04-06 VITALS
RESPIRATION RATE: 16 BRPM | DIASTOLIC BLOOD PRESSURE: 82 MMHG | HEIGHT: 68 IN | OXYGEN SATURATION: 99 % | WEIGHT: 235 LBS | BODY MASS INDEX: 35.61 KG/M2 | TEMPERATURE: 97.5 F | HEART RATE: 82 BPM | SYSTOLIC BLOOD PRESSURE: 126 MMHG

## 2022-04-06 DIAGNOSIS — J30.9 ALLERGIC RHINITIS, UNSPECIFIED: ICD-10-CM

## 2022-04-06 DIAGNOSIS — U07.1 COVID-19: ICD-10-CM

## 2022-04-06 PROCEDURE — 99214 OFFICE O/P EST MOD 30 MIN: CPT

## 2022-04-06 RX ORDER — CLONAZEPAM 0.5 MG/1
0.5 TABLET ORAL
Qty: 30 | Refills: 0 | Status: DISCONTINUED | COMMUNITY
Start: 2020-12-23 | End: 2022-04-06

## 2022-04-06 RX ORDER — CETIRIZINE HCL 10 MG
10 TABLET ORAL
Refills: 0 | Status: ACTIVE | COMMUNITY

## 2022-04-06 RX ORDER — FLUTICASONE PROPIONATE AND SALMETEROL 250; 50 UG/1; UG/1
250-50 POWDER RESPIRATORY (INHALATION)
Qty: 60 | Refills: 1 | Status: DISCONTINUED | COMMUNITY
Start: 2021-07-07 | End: 2022-04-06

## 2022-04-06 NOTE — ASSESSMENT
[FreeTextEntry1] : Plan as above.\par \par The patient was encouraged to get her COVID booster soon as possible.\par \par The patient was advised to call for any problems or questions.\par \par Return in November for annual exam, sooner if needed.

## 2022-04-06 NOTE — PHYSICAL EXAM
[Normal Sclera/Conjunctiva] : normal sclera/conjunctiva [PERRL] : pupils equal round and reactive to light [EOMI] : extraocular movements intact [Fundoscopic Exam Performed] : fundoscopic ~T exam ~C was performed [Normal Outer Ear/Nose] : the outer ears and nose were normal in appearance [Normal TMs] : both tympanic membranes were normal [Normal Percussion] : the chest was normal to percussion [No Edema] : there was no peripheral edema [Normal Supraclavicular Nodes] : no supraclavicular lymphadenopathy [Coordination Grossly Intact] : coordination grossly intact [No Focal Deficits] : no focal deficits [Normal Gait] : normal gait [Normal] : affect was normal and insight and judgment were intact [de-identified] : Discs flat and vessels normal bilaterally. [de-identified] : Paranasal sinuses nontender on exam.

## 2022-04-06 NOTE — HISTORY OF PRESENT ILLNESS
[FreeTextEntry1] : 46-year-old female complaining of headaches. [de-identified] : The patient reports that for the past month or so she has been having headaches several times per week.  They tend to occur in the frontal region of her head and towards the vertex of her skull.  They are not associated with nasal congestion or her other allergy symptoms, and their neck consistent with the migraine headaches that the patient occasionally gets.  There is no associated nausea, no scotomata and these headaches do not respond to her zolmitriptan.  They do respond to Advil, but the patient is supposed to avoid that medication because of her history of gastric surgery.  These headaches tend to occur later in the day, but occur on weekend days when the patient is relatively stress free.  There are no associated neurologic symptoms.

## 2022-04-11 PROBLEM — Z11.59 SCREENING FOR VIRAL DISEASE: Status: RESOLVED | Noted: 2020-11-25 | Resolved: 2020-11-27

## 2022-04-20 ENCOUNTER — NON-APPOINTMENT (OUTPATIENT)
Age: 47
End: 2022-04-20

## 2022-04-20 DIAGNOSIS — Z20.822 CONTACT WITH AND (SUSPECTED) EXPOSURE TO COVID-19: ICD-10-CM

## 2022-04-21 ENCOUNTER — NON-APPOINTMENT (OUTPATIENT)
Age: 47
End: 2022-04-21

## 2022-06-11 ENCOUNTER — TRANSCRIPTION ENCOUNTER (OUTPATIENT)
Age: 47
End: 2022-06-11

## 2022-06-12 ENCOUNTER — TRANSCRIPTION ENCOUNTER (OUTPATIENT)
Age: 47
End: 2022-06-12

## 2022-06-13 ENCOUNTER — APPOINTMENT (OUTPATIENT)
Dept: FAMILY MEDICINE | Facility: CLINIC | Age: 47
End: 2022-06-13

## 2022-06-13 ENCOUNTER — LABORATORY RESULT (OUTPATIENT)
Age: 47
End: 2022-06-13

## 2022-06-13 VITALS
BODY MASS INDEX: 35.4 KG/M2 | HEIGHT: 69 IN | OXYGEN SATURATION: 99 % | HEART RATE: 97 BPM | TEMPERATURE: 97.6 F | WEIGHT: 239 LBS | RESPIRATION RATE: 18 BRPM | SYSTOLIC BLOOD PRESSURE: 127 MMHG | DIASTOLIC BLOOD PRESSURE: 82 MMHG

## 2022-06-13 DIAGNOSIS — Z86.2 PERSONAL HISTORY OF DISEASES OF THE BLOOD AND BLOOD-FORMING ORGANS AND CERTAIN DISORDERS INVOLVING THE IMMUNE MECHANISM: ICD-10-CM

## 2022-06-13 DIAGNOSIS — R53.83 OTHER FATIGUE: ICD-10-CM

## 2022-06-13 DIAGNOSIS — Z87.898 PERSONAL HISTORY OF OTHER SPECIFIED CONDITIONS: ICD-10-CM

## 2022-06-13 DIAGNOSIS — Z65.8 OTHER SPECIFIED PROBLEMS RELATED TO PSYCHOSOCIAL CIRCUMSTANCES: ICD-10-CM

## 2022-06-13 DIAGNOSIS — R20.2 PARESTHESIA OF SKIN: ICD-10-CM

## 2022-06-13 PROCEDURE — 99214 OFFICE O/P EST MOD 30 MIN: CPT | Mod: 25

## 2022-06-13 PROCEDURE — 36415 COLL VENOUS BLD VENIPUNCTURE: CPT

## 2022-06-13 RX ORDER — GUAIFENESIN AND CODEINE PHOSPHATE 10; 100 MG/5ML; MG/5ML
100-10 SOLUTION ORAL
Qty: 120 | Refills: 0 | Status: DISCONTINUED | COMMUNITY
Start: 2022-04-20 | End: 2022-06-13

## 2022-06-14 LAB
25(OH)D3 SERPL-MCNC: 32.4 NG/ML
BASOPHILS # BLD AUTO: 0.04 K/UL
BASOPHILS NFR BLD AUTO: 0.8 %
EOSINOPHIL # BLD AUTO: 0.08 K/UL
EOSINOPHIL NFR BLD AUTO: 1.5 %
FERRITIN SERPL-MCNC: 27 NG/ML
HCT VFR BLD CALC: 36.5 %
HGB BLD-MCNC: 11.3 G/DL
IMM GRANULOCYTES NFR BLD AUTO: 0.2 %
IRON SATN MFR SERPL: 9 %
IRON SERPL-MCNC: 37 UG/DL
LYMPHOCYTES # BLD AUTO: 1.41 K/UL
LYMPHOCYTES NFR BLD AUTO: 26.9 %
MAN DIFF?: NORMAL
MCHC RBC-ENTMCNC: 26.2 PG
MCHC RBC-ENTMCNC: 31 GM/DL
MCV RBC AUTO: 84.5 FL
MONOCYTES # BLD AUTO: 0.33 K/UL
MONOCYTES NFR BLD AUTO: 6.3 %
NEUTROPHILS # BLD AUTO: 3.37 K/UL
NEUTROPHILS NFR BLD AUTO: 64.3 %
PLATELET # BLD AUTO: 251 K/UL
RBC # BLD: 4.32 M/UL
RBC # FLD: 14.2 %
TIBC SERPL-MCNC: 395 UG/DL
UIBC SERPL-MCNC: 358 UG/DL
VIT B12 SERPL-MCNC: 484 PG/ML
WBC # FLD AUTO: 5.24 K/UL

## 2022-06-17 ENCOUNTER — TRANSCRIPTION ENCOUNTER (OUTPATIENT)
Age: 47
End: 2022-06-17

## 2022-06-17 DIAGNOSIS — R06.02 SHORTNESS OF BREATH: ICD-10-CM

## 2022-06-17 DIAGNOSIS — R07.89 OTHER CHEST PAIN: ICD-10-CM

## 2022-06-21 ENCOUNTER — APPOINTMENT (OUTPATIENT)
Dept: GASTROENTEROLOGY | Facility: CLINIC | Age: 47
End: 2022-06-21
Payer: COMMERCIAL

## 2022-06-21 VITALS
TEMPERATURE: 97.3 F | OXYGEN SATURATION: 98 % | BODY MASS INDEX: 35.4 KG/M2 | SYSTOLIC BLOOD PRESSURE: 135 MMHG | HEIGHT: 69 IN | DIASTOLIC BLOOD PRESSURE: 80 MMHG | WEIGHT: 239 LBS | HEART RATE: 104 BPM

## 2022-06-21 PROCEDURE — 99214 OFFICE O/P EST MOD 30 MIN: CPT

## 2022-06-21 NOTE — HISTORY OF PRESENT ILLNESS
[FreeTextEntry1] : Ms. Vences is a pleasant 47F h/o anxiety, sleeve gastrectomy 2014, HLD, migraines, obesity, appendectomy who returns for f/u.\par \par She was previously seen 4/21 for iron deficiency anemia, underwent an EGD/colonoscopy with myself 5/21 showing a large hiatal hernia, internal hemorrhoids.\par \par Recently she has had a severe cough, has had difficulty eating solid foods over the past week. States she has occasionally been coughing up solid foods, although she has had no difficulty swallowing fluids.  Not all foods are being coughed up, many are going down.\par \par Saw Dr. Triana on 6/19/22, had a TNE in the office, only noted inflammation, was placed on protonix which has not yet led to any relief of symptoms.\par \par Would be open to hiatal hernia repair.

## 2022-06-21 NOTE — ASSESSMENT
[FreeTextEntry1] : Strongly suspect symptoms are due to her previously diagnosed large hiatal hernia.\par \par Will obtain an X-ray esophagram.\par \par Will refer to Dr. Antunez for evaluation for hiatal hernia repair.

## 2022-06-23 ENCOUNTER — NON-APPOINTMENT (OUTPATIENT)
Age: 47
End: 2022-06-23

## 2022-06-24 ENCOUNTER — TRANSCRIPTION ENCOUNTER (OUTPATIENT)
Age: 47
End: 2022-06-24

## 2022-06-27 ENCOUNTER — RESULT REVIEW (OUTPATIENT)
Age: 47
End: 2022-06-27

## 2022-07-01 ENCOUNTER — APPOINTMENT (OUTPATIENT)
Dept: FAMILY MEDICINE | Facility: CLINIC | Age: 47
End: 2022-07-01

## 2022-07-01 ENCOUNTER — APPOINTMENT (OUTPATIENT)
Dept: THORACIC SURGERY | Facility: CLINIC | Age: 47
End: 2022-07-01

## 2022-07-01 VITALS
RESPIRATION RATE: 18 BRPM | HEART RATE: 95 BPM | WEIGHT: 235 LBS | OXYGEN SATURATION: 97 % | BODY MASS INDEX: 34.8 KG/M2 | HEIGHT: 69 IN | TEMPERATURE: 98.2 F | DIASTOLIC BLOOD PRESSURE: 86 MMHG | SYSTOLIC BLOOD PRESSURE: 124 MMHG

## 2022-07-01 DIAGNOSIS — R19.8 OTHER SPECIFIED SYMPTOMS AND SIGNS INVOLVING THE DIGESTIVE SYSTEM AND ABDOMEN: ICD-10-CM

## 2022-07-01 DIAGNOSIS — R68.89 OTHER GENERAL SYMPTOMS AND SIGNS: ICD-10-CM

## 2022-07-01 PROCEDURE — 36415 COLL VENOUS BLD VENIPUNCTURE: CPT

## 2022-07-01 PROCEDURE — 99204 OFFICE O/P NEW MOD 45 MIN: CPT

## 2022-07-01 PROCEDURE — 99214 OFFICE O/P EST MOD 30 MIN: CPT | Mod: 25

## 2022-07-07 ENCOUNTER — TRANSCRIPTION ENCOUNTER (OUTPATIENT)
Age: 47
End: 2022-07-07

## 2022-07-07 LAB
ALBUMIN SERPL ELPH-MCNC: 3.8 G/DL
ALP BLD-CCNC: 107 U/L
ALT SERPL-CCNC: 135 U/L
ANION GAP SERPL CALC-SCNC: 15 MMOL/L
AST SERPL-CCNC: 117 U/L
BILIRUB SERPL-MCNC: 0.3 MG/DL
BUN SERPL-MCNC: 8 MG/DL
CALCIUM SERPL-MCNC: 8.9 MG/DL
CHLORIDE SERPL-SCNC: 102 MMOL/L
CO2 SERPL-SCNC: 22 MMOL/L
CREAT SERPL-MCNC: 0.77 MG/DL
EGFR: 97 ML/MIN/1.73M2
ESTIMATED AVERAGE GLUCOSE: 120 MG/DL
GLUCOSE SERPL-MCNC: 95 MG/DL
HAV IGM SER QL: NONREACTIVE
HBA1C MFR BLD HPLC: 5.8 %
HBV CORE IGM SER QL: NONREACTIVE
HBV SURFACE AG SER QL: NONREACTIVE
HCV AB SER QL: NONREACTIVE
HCV S/CO RATIO: 0.24 S/CO
POTASSIUM SERPL-SCNC: 3.8 MMOL/L
PROT SERPL-MCNC: 7.3 G/DL
SODIUM SERPL-SCNC: 138 MMOL/L
TSH SERPL-ACNC: 0.79 UIU/ML

## 2022-07-15 ENCOUNTER — APPOINTMENT (OUTPATIENT)
Dept: BARIATRICS | Facility: CLINIC | Age: 47
End: 2022-07-15

## 2022-07-15 ENCOUNTER — NON-APPOINTMENT (OUTPATIENT)
Age: 47
End: 2022-07-15

## 2022-07-15 ENCOUNTER — TRANSCRIPTION ENCOUNTER (OUTPATIENT)
Age: 47
End: 2022-07-15

## 2022-07-15 VITALS — BODY MASS INDEX: 34.41 KG/M2 | WEIGHT: 233 LBS

## 2022-07-15 PROCEDURE — 99214 OFFICE O/P EST MOD 30 MIN: CPT | Mod: 95

## 2022-07-15 NOTE — REASON FOR VISIT
[Home] : at home, [unfilled] , at the time of the visit. [Medical Office: ([facility])___] : at the medical office located in  [Patient] : the patient [Initial Consult] : an initial consult for

## 2022-07-15 NOTE — HISTORY OF PRESENT ILLNESS
[de-identified] : Magdalene Vences 46 y/o female came in via telehealth for an initial consult. Patient's current weight is 233lbs. Patient's past medical history of chronic GERD, hyperlipidemia, dyspnea, consistent cough. Patient had a sleeve gastrectomy and reports she has gained back her weight.  On the upper GI series report there is no hiatal hernia but patient has laryngopharyngeal reflux and has post nasal drip. Patient has increase in intraabdominal pressure due to patient gaining her weight back and also due to patient undergoing abdominal plasty. Patient reports her recent blood work shows an increase in liver enzyme that was probably either due to patient having COVID due to some inflammatory response. Patient to repeat lab work. Discussed with the patient due to her significant GERD, laryngopharyngeal reflux, and post nasal drip  patient's only option is gastric bypass. Discussed with the patient the risks and benefits of surgery and discussed explanations. Discussed with the patient the importance of maintaining a healthy lifestyle. Discussed with the patient to continue to follow dieting regime and exercising regularly. Patient to continue to take Pepcid every night and take nexium in the morning daily as well. Patient would like to think this through and will try to lose weight on her own and will follow up with us in 6 months if she would like to move forward with surgery.

## 2022-07-15 NOTE — ASSESSMENT
[FreeTextEntry1] : Patient to continue to take Pepcid every night and take Nexium in the morning daily as well. Patient would like to think this through and will try to lose weight on her own and will follow up with us in 6 months if she would like to move forward with surgery.

## 2022-07-20 ENCOUNTER — APPOINTMENT (OUTPATIENT)
Dept: FAMILY MEDICINE | Facility: CLINIC | Age: 47
End: 2022-07-20

## 2022-07-25 ENCOUNTER — TRANSCRIPTION ENCOUNTER (OUTPATIENT)
Age: 47
End: 2022-07-25

## 2022-08-03 ENCOUNTER — APPOINTMENT (OUTPATIENT)
Dept: PULMONOLOGY | Facility: CLINIC | Age: 47
End: 2022-08-03

## 2022-08-03 VITALS
SYSTOLIC BLOOD PRESSURE: 110 MMHG | OXYGEN SATURATION: 98 % | HEIGHT: 69 IN | WEIGHT: 233 LBS | TEMPERATURE: 97.5 F | DIASTOLIC BLOOD PRESSURE: 80 MMHG | BODY MASS INDEX: 34.51 KG/M2 | HEART RATE: 103 BPM

## 2022-08-03 PROCEDURE — 99214 OFFICE O/P EST MOD 30 MIN: CPT

## 2022-08-03 NOTE — REVIEW OF SYSTEMS
[Shortness Of Breath] : no shortness of breath [Cough] : cough [Headache] : headache [Negative] : Heme/Lymph

## 2022-08-03 NOTE — PHYSICAL EXAM
[No Acute Distress] : no acute distress [Well Developed] : well developed [Well-Appearing] : well-appearing [Normal Sclera/Conjunctiva] : normal sclera/conjunctiva [PERRL] : pupils equal round and reactive to light [EOMI] : extraocular movements intact [Normal Outer Ear/Nose] : the outer ears and nose were normal in appearance [No JVD] : no jugular venous distention [No Lymphadenopathy] : no lymphadenopathy [Supple] : supple [Thyroid Normal, No Nodules] : the thyroid was normal and there were no nodules present [No Respiratory Distress] : no respiratory distress  [No Accessory Muscle Use] : no accessory muscle use [Clear to Auscultation] : lungs were clear to auscultation bilaterally [Normal Rate] : normal rate  [Regular Rhythm] : with a regular rhythm [Normal S1, S2] : normal S1 and S2 [No Murmur] : no murmur heard [No Carotid Bruits] : no carotid bruits [No Abdominal Bruit] : a ~M bruit was not heard ~T in the abdomen [No Varicosities] : no varicosities [Pedal Pulses Present] : the pedal pulses are present [No Edema] : there was no peripheral edema [No Palpable Aorta] : no palpable aorta [No Extremity Clubbing/Cyanosis] : no extremity clubbing/cyanosis [Soft] : abdomen soft [Non Tender] : non-tender [Non-distended] : non-distended [No Masses] : no abdominal mass palpated [No HSM] : no HSM [Normal Bowel Sounds] : normal bowel sounds [Normal Posterior Cervical Nodes] : no posterior cervical lymphadenopathy [Normal Anterior Cervical Nodes] : no anterior cervical lymphadenopathy [No Joint Swelling] : no joint swelling [Grossly Normal Strength/Tone] : grossly normal strength/tone [No Focal Deficits] : no focal deficits [Normal Gait] : normal gait [Normal Affect] : the affect was normal [Normal Insight/Judgement] : insight and judgment were intact [de-identified] : obese [de-identified] : mallampati 2 with large tonsils

## 2022-08-03 NOTE — HISTORY OF PRESENT ILLNESS
[FreeTextEntry1] : Followup sleep apnea syndrome [de-identified] : Patient was seen by me in December of 2021 with excessive daytime sleepiness. She underwent a home sleep study in January of 2022. This revealed moderate sleep apnea with an AHI of 16.8. Patient apparently did not followup with CPAP therapy. She is known to have loud snoring. Her bedtime is between 11 PM and 6:30. She frequently wakes up at night about 3 or 4 times a night for short periods. She has occasional trouble falling back to sleep. She occasionally wakes up gasping for air about once per week. Her main complaint is that she is very sleepy during the day with an [place] score of 15. She is a .

## 2022-08-03 NOTE — ASSESSMENT
[FreeTextEntry1] : Patient with moderate sleep apnea with an AHI of 16.8. Patient is excessively sleepy with an [place] score of 15. Will arrange for auto CPAP unit.

## 2022-08-04 ENCOUNTER — APPOINTMENT (OUTPATIENT)
Dept: GASTROENTEROLOGY | Facility: CLINIC | Age: 47
End: 2022-08-04

## 2022-08-04 VITALS
HEART RATE: 73 BPM | WEIGHT: 233 LBS | TEMPERATURE: 98.1 F | BODY MASS INDEX: 34.51 KG/M2 | HEIGHT: 69 IN | SYSTOLIC BLOOD PRESSURE: 120 MMHG | DIASTOLIC BLOOD PRESSURE: 72 MMHG | OXYGEN SATURATION: 98 %

## 2022-08-04 PROCEDURE — 99214 OFFICE O/P EST MOD 30 MIN: CPT

## 2022-08-04 NOTE — ASSESSMENT
[FreeTextEntry1] : Continue nexium once or twice daily based on symptoms, given that her symptoms are well controlled.\par \par Continue with weight loss through diet and exercise. This will led to better symptom control of her GERD.\par \par I suspect her liver enzyme elevation is due to fatty liver.  Would recheck her liver enzymes at her appointment in October. If still elevated, I would follow up with her for a secondary liver enzyme workup and an abd U/S.

## 2022-08-04 NOTE — HISTORY OF PRESENT ILLNESS
[FreeTextEntry1] : Ms. Vences is a pleasant 47F h/o anxiety, sleeve gastrectomy 2014, HLD, migraines, obesity, appendectomy who returns for f/u.\par \par Previously seen 6/22 for a chronic cough, since that time she has been taking Nexium once or twice daily which has significantly helped with her symptoms.\par \par She underwent an EGD/colonoscopy with myself 5/21 showing a large hiatal hernia, internal hemorrhoids.  She underwent a recent X-ray esophagram on 6/27/22 (she notes the machine broke and was not used appropriately) which did not show a hiatal hernia.  She does request a repeat eventually.\par \par Her cough is now much improved.\par \par She has seen Dr. Antunez who noted he was not the appropriate physician for her given her bariatric surgery, she thus followed with Dr. Toro who advised that a conversion to a RYGB would be appropriate if she could not control her symptoms with medications, or through diet and weight loss.\par \par She has since lost 5 pounds, is very motived to lose weight.\par \par She had recent labs on 7/1/22:\par TB 0.3\par \par \par Alk phos 107

## 2022-08-11 ENCOUNTER — APPOINTMENT (OUTPATIENT)
Dept: HEART AND VASCULAR | Facility: CLINIC | Age: 47
End: 2022-08-11

## 2022-08-11 ENCOUNTER — NON-APPOINTMENT (OUTPATIENT)
Age: 47
End: 2022-08-11

## 2022-08-11 VITALS
SYSTOLIC BLOOD PRESSURE: 110 MMHG | HEART RATE: 64 BPM | WEIGHT: 233 LBS | HEIGHT: 69 IN | BODY MASS INDEX: 34.51 KG/M2 | DIASTOLIC BLOOD PRESSURE: 80 MMHG

## 2022-08-11 DIAGNOSIS — R05.9 COUGH, UNSPECIFIED: ICD-10-CM

## 2022-08-11 PROCEDURE — 99401 PREV MED CNSL INDIV APPRX 15: CPT

## 2022-08-11 PROCEDURE — 93000 ELECTROCARDIOGRAM COMPLETE: CPT

## 2022-08-11 PROCEDURE — 99214 OFFICE O/P EST MOD 30 MIN: CPT | Mod: 25

## 2022-08-11 NOTE — PHYSICAL EXAM
[General Appearance - Well Developed] : well developed [Normal Appearance] : normal appearance [Well Groomed] : well groomed [General Appearance - Well Nourished] : well nourished [No Deformities] : no deformities [General Appearance - In No Acute Distress] : no acute distress [Normal Conjunctiva] : the conjunctiva exhibited no abnormalities [Eyelids - No Xanthelasma] : the eyelids demonstrated no xanthelasmas [Normal Oral Mucosa] : normal oral mucosa [No Oral Pallor] : no oral pallor [No Oral Cyanosis] : no oral cyanosis [Normal Jugular Venous A Waves Present] : normal jugular venous A waves present [Normal Jugular Venous V Waves Present] : normal jugular venous V waves present [No Jugular Venous Brandon A Waves] : no jugular venous brandon A waves [Respiration, Rhythm And Depth] : normal respiratory rhythm and effort [Exaggerated Use Of Accessory Muscles For Inspiration] : no accessory muscle use [Auscultation Breath Sounds / Voice Sounds] : lungs were clear to auscultation bilaterally [Heart Rate And Rhythm] : heart rate and rhythm were normal [Heart Sounds] : normal S1 and S2 [Murmurs] : no murmurs present [Abdomen Soft] : soft [Abdomen Tenderness] : non-tender [Abdomen Mass (___ Cm)] : no abdominal mass palpated [Abnormal Walk] : normal gait [Gait - Sufficient For Exercise Testing] : the gait was sufficient for exercise testing [Nail Clubbing] : no clubbing of the fingernails [Cyanosis, Localized] : no localized cyanosis [Petechial Hemorrhages (___cm)] : no petechial hemorrhages [Skin Color & Pigmentation] : normal skin color and pigmentation [] : no rash [No Venous Stasis] : no venous stasis [Skin Lesions] : no skin lesions [No Skin Ulcers] : no skin ulcer [No Xanthoma] : no  xanthoma was observed [Oriented To Time, Place, And Person] : oriented to person, place, and time [Affect] : the affect was normal [Mood] : the mood was normal [No Anxiety] : not feeling anxious [FreeTextEntry1] : obese

## 2022-08-11 NOTE — HISTORY OF PRESENT ILLNESS
[FreeTextEntry1] : 47 F obesity, prior HTN which resolved with weight loss\par \par Is extremely active, walks on average 8k steps.  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  \par Says she has an occasional "flutter" a couple times a week and lasts  < 5 seconds.  \par No exertional cp or sob. \par Drinks a lot of soda and notes this is her vice.  Avg around 40 oz a soda per day\par \par Non smoker\par No EtOH use\par Fhx: Grandfather  CAD 50s, Father CAD/CABG 40s

## 2022-08-11 NOTE — COUNSELING
[Dietary habits] : dietary habits [Exercise programs] : exercise programs [Weight loss strategies] : weight loss strategies [FreeTextEntry1] : 15

## 2022-08-11 NOTE — REASON FOR VISIT
[Initial Visit] : an initial visit for [Follow-Up - Clinic] : a clinic follow-up of [FreeTextEntry2] : borderline HTN - cough with chest pains,

## 2022-08-11 NOTE — ASSESSMENT
[FreeTextEntry1] : 46 yo F\par \par Palpitations - may be related to PACs, noted on EKG today\par HTN - controlled off meds\par HLD -\par Obesity -\par Pre DM -\par Fatty Liver\par Strong Fhx CAD\par \par EKG today NSR, APC\par PFTs (feb 2022) - borderline restrictive ventilatory impairment with a normal diffusion capacity - consistent with dx of obesity. \par TST (Jan 2021) - Normal test - mild chest pain - no arrhythmias - normal ST segment response\par Echo (Jan 2021) - EF 67% - normal LV size, function and wall thickness.  \par \par - check lipids today\par - check carotid US to better assess CV risk.  Multiple risk factors.  \par - place 7 day holter to assess for arrhythmias in setting of not infrequent palpitations\par - counseled at length on dietary and exercise programs to help reduce her CV risk.  Sodas are a big problem that she is aware of and will try to cut down on / eliminate from her diet. \par - may benefit from statin therapy, pending results of above testing\par - rtc 6 weeks \par \par

## 2022-08-12 LAB
CHOLEST SERPL-MCNC: 190 MG/DL
HDLC SERPL-MCNC: 42 MG/DL
LDLC SERPL CALC-MCNC: 122 MG/DL
NONHDLC SERPL-MCNC: 148 MG/DL
TRIGL SERPL-MCNC: 132 MG/DL

## 2022-08-22 ENCOUNTER — RESULT REVIEW (OUTPATIENT)
Age: 47
End: 2022-08-22

## 2022-09-20 ENCOUNTER — APPOINTMENT (OUTPATIENT)
Dept: HEART AND VASCULAR | Facility: CLINIC | Age: 47
End: 2022-09-20

## 2022-09-20 VITALS
BODY MASS INDEX: 34.51 KG/M2 | HEART RATE: 84 BPM | WEIGHT: 233 LBS | DIASTOLIC BLOOD PRESSURE: 82 MMHG | HEIGHT: 69 IN | SYSTOLIC BLOOD PRESSURE: 130 MMHG

## 2022-09-20 PROCEDURE — 99213 OFFICE O/P EST LOW 20 MIN: CPT

## 2022-09-20 NOTE — ASSESSMENT
[FreeTextEntry1] : 48 yo F\par \par Palpitations/Brief SVT on 72 hr Holter\par HTN - controlled off meds\par HLD - LDL \par Obesity -\par Pre DM -\par Fatty Liver\par Strong Fhx CAD\par \par EKG today NSR, APC\par Carotid US Aug 2022: no plaque\par PFTs (feb 2022) - borderline restrictive ventilatory impairment with a normal diffusion capacity - consistent with dx of obesity. \par TST (Jan 2021) - Normal test - mild chest pain - no arrhythmias - normal ST segment response\par Echo (Jan 2021) - EF 67% - normal LV size, function and wall thickness.  \par Lipids Aug 2022: , HDL 42, , Total 190\par \par - counseled again  on dietary and exercise programs to help reduce her CV risk.  Needs to start with cutting back sodas!   \par - repeat LFTs, if not elevated would ideally start low dose lipitor 10mg.  Following with GI for fatty liver.  \par - rare/brief svt which coincides with her palps. not significantly bothersome.  will monitor for now.  \par \par

## 2022-09-20 NOTE — HISTORY OF PRESENT ILLNESS
[FreeTextEntry1] : 47 F obesity, prior HTN which resolved with weight loss\par \par Is extremely active, walks on average 8k steps.  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  \par Says she has an occasional "flutter" a couple times a week and lasts  < 5 seconds.  \par No exertional cp or sob. \par Drinks a lot of soda and notes this is her vice.  Avg around 40 oz a soda per day\par \par 22:  started walking more since last visit, still drinking same amount of soda.  Holter with rare/brief svt that coincided with her palpitations.  not bothersome.  \par \par Non smoker\par No EtOH use\par Fhx: Grandfather  CAD 50s, Father CAD/CABG 40s

## 2022-10-04 ENCOUNTER — APPOINTMENT (OUTPATIENT)
Dept: INTERNAL MEDICINE | Facility: CLINIC | Age: 47
End: 2022-10-04

## 2022-10-04 VITALS
WEIGHT: 233 LBS | SYSTOLIC BLOOD PRESSURE: 140 MMHG | OXYGEN SATURATION: 96 % | BODY MASS INDEX: 34.51 KG/M2 | HEART RATE: 85 BPM | HEIGHT: 69 IN | DIASTOLIC BLOOD PRESSURE: 80 MMHG

## 2022-10-04 PROCEDURE — 99214 OFFICE O/P EST MOD 30 MIN: CPT | Mod: 25

## 2022-10-04 PROCEDURE — 36415 COLL VENOUS BLD VENIPUNCTURE: CPT

## 2022-10-04 NOTE — HISTORY OF PRESENT ILLNESS
[FreeTextEntry1] : Follow-up anemia and elevated liver enzymes [de-identified] : Patient was followed by Dr. Villanueva in the past\par She has a history of elevated liver enzymes, fatty liver ultrasound, hiatal hernia, iron Deficiency, Sleep Apnea, Anxiety and Depression. \par Last ALT//135 7/2022.\par

## 2022-10-04 NOTE — PHYSICAL EXAM
[No JVD] : no jugular venous distention [No Lymphadenopathy] : no lymphadenopathy [No Respiratory Distress] : no respiratory distress  [Clear to Auscultation] : lungs were clear to auscultation bilaterally [Normal Rate] : normal rate  [Normal S1, S2] : normal S1 and S2 [No Acute Distress] : no acute distress [Regular Rhythm] : with a regular rhythm [No Carotid Bruits] : no carotid bruits [No Edema] : there was no peripheral edema [Soft] : abdomen soft [Non Tender] : non-tender [Normal Bowel Sounds] : normal bowel sounds [Normal] : affect was normal and insight and judgment were intact

## 2022-10-04 NOTE — REVIEW OF SYSTEMS
[Fever] : no fever [Fatigue] : no fatigue [Chest Pain] : no chest pain [Shortness Of Breath] : no shortness of breath [Cough] : no cough [Abdominal Pain] : no abdominal pain [Dysuria] : no dysuria [Headache] : no headache [Dizziness] : no dizziness [Anxiety] : anxiety

## 2022-10-07 LAB
ALBUMIN SERPL ELPH-MCNC: 4.3 G/DL
ALP BLD-CCNC: 102 U/L
ALT SERPL-CCNC: 13 U/L
ANION GAP SERPL CALC-SCNC: 10 MMOL/L
AST SERPL-CCNC: 14 U/L
BASOPHILS # BLD AUTO: 0.07 K/UL
BASOPHILS NFR BLD AUTO: 0.8 %
BILIRUB SERPL-MCNC: 0.2 MG/DL
BUN SERPL-MCNC: 12 MG/DL
CALCIUM SERPL-MCNC: 9.8 MG/DL
CHLORIDE SERPL-SCNC: 103 MMOL/L
CO2 SERPL-SCNC: 26 MMOL/L
CREAT SERPL-MCNC: 0.84 MG/DL
EGFR: 87 ML/MIN/1.73M2
EOSINOPHIL # BLD AUTO: 0.15 K/UL
EOSINOPHIL NFR BLD AUTO: 1.8 %
ESTIMATED AVERAGE GLUCOSE: 111 MG/DL
FERRITIN SERPL-MCNC: 8 NG/ML
GGT SERPL-CCNC: 9 U/L
GLUCOSE SERPL-MCNC: 90 MG/DL
HBA1C MFR BLD HPLC: 5.5 %
HCT VFR BLD CALC: 34.9 %
HGB BLD-MCNC: 11 G/DL
IMM GRANULOCYTES NFR BLD AUTO: 0.2 %
IRON SERPL-MCNC: 28 UG/DL
LYMPHOCYTES # BLD AUTO: 3.27 K/UL
LYMPHOCYTES NFR BLD AUTO: 39.6 %
MAN DIFF?: NORMAL
MCHC RBC-ENTMCNC: 26 PG
MCHC RBC-ENTMCNC: 31.5 GM/DL
MCV RBC AUTO: 82.5 FL
MONOCYTES # BLD AUTO: 0.53 K/UL
MONOCYTES NFR BLD AUTO: 6.4 %
NEUTROPHILS # BLD AUTO: 4.21 K/UL
NEUTROPHILS NFR BLD AUTO: 51.2 %
PLATELET # BLD AUTO: 319 K/UL
POTASSIUM SERPL-SCNC: 4.1 MMOL/L
PROT SERPL-MCNC: 7.9 G/DL
RBC # BLD: 4.23 M/UL
RBC # FLD: 13.5 %
SODIUM SERPL-SCNC: 139 MMOL/L
WBC # FLD AUTO: 8.25 K/UL

## 2022-10-19 ENCOUNTER — NON-APPOINTMENT (OUTPATIENT)
Age: 47
End: 2022-10-19

## 2023-01-17 ENCOUNTER — APPOINTMENT (OUTPATIENT)
Dept: INTERNAL MEDICINE | Facility: CLINIC | Age: 48
End: 2023-01-17
Payer: COMMERCIAL

## 2023-01-17 VITALS
HEIGHT: 69 IN | SYSTOLIC BLOOD PRESSURE: 124 MMHG | BODY MASS INDEX: 35.55 KG/M2 | DIASTOLIC BLOOD PRESSURE: 82 MMHG | TEMPERATURE: 97 F | RESPIRATION RATE: 16 BRPM | WEIGHT: 240 LBS | HEART RATE: 80 BPM | OXYGEN SATURATION: 99 %

## 2023-01-17 DIAGNOSIS — R51.9 HEADACHE, UNSPECIFIED: ICD-10-CM

## 2023-01-17 DIAGNOSIS — Z91.89 OTHER SPECIFIED PERSONAL RISK FACTORS, NOT ELSEWHERE CLASSIFIED: ICD-10-CM

## 2023-01-17 PROCEDURE — 99214 OFFICE O/P EST MOD 30 MIN: CPT

## 2023-02-21 ENCOUNTER — TRANSCRIPTION ENCOUNTER (OUTPATIENT)
Age: 48
End: 2023-02-21

## 2023-03-02 ENCOUNTER — NON-APPOINTMENT (OUTPATIENT)
Age: 48
End: 2023-03-02

## 2023-03-02 ENCOUNTER — APPOINTMENT (OUTPATIENT)
Dept: INTERNAL MEDICINE | Facility: CLINIC | Age: 48
End: 2023-03-02
Payer: COMMERCIAL

## 2023-03-02 VITALS
DIASTOLIC BLOOD PRESSURE: 72 MMHG | TEMPERATURE: 97.6 F | HEIGHT: 69 IN | RESPIRATION RATE: 16 BRPM | WEIGHT: 240 LBS | OXYGEN SATURATION: 97 % | SYSTOLIC BLOOD PRESSURE: 118 MMHG | BODY MASS INDEX: 35.55 KG/M2 | HEART RATE: 78 BPM

## 2023-03-02 DIAGNOSIS — Z00.00 ENCOUNTER FOR GENERAL ADULT MEDICAL EXAMINATION W/OUT ABNORMAL FINDINGS: ICD-10-CM

## 2023-03-02 DIAGNOSIS — E61.1 IRON DEFICIENCY: ICD-10-CM

## 2023-03-02 DIAGNOSIS — F32.A DEPRESSION, UNSPECIFIED: ICD-10-CM

## 2023-03-02 DIAGNOSIS — J45.909 UNSPECIFIED ASTHMA, UNCOMPLICATED: ICD-10-CM

## 2023-03-02 DIAGNOSIS — Z11.9 ENCOUNTER FOR SCREENING FOR INFECTIOUS AND PARASITIC DISEASES, UNSPECIFIED: ICD-10-CM

## 2023-03-02 PROCEDURE — 93000 ELECTROCARDIOGRAM COMPLETE: CPT

## 2023-03-02 PROCEDURE — 36415 COLL VENOUS BLD VENIPUNCTURE: CPT

## 2023-03-02 PROCEDURE — 99214 OFFICE O/P EST MOD 30 MIN: CPT | Mod: 25

## 2023-03-02 PROCEDURE — 99396 PREV VISIT EST AGE 40-64: CPT | Mod: 25

## 2023-03-02 RX ORDER — ESOMEPRAZOLE MAGNESIUM 40 MG/1
40 CAPSULE, DELAYED RELEASE ORAL DAILY
Refills: 0 | Status: ACTIVE | COMMUNITY

## 2023-03-05 PROBLEM — J45.909 ASTHMA: Status: ACTIVE | Noted: 2021-10-26

## 2023-03-05 RX ORDER — BUPROPION HYDROCHLORIDE 300 MG/1
300 TABLET, EXTENDED RELEASE ORAL
Qty: 90 | Refills: 3 | Status: ACTIVE | COMMUNITY
Start: 2023-03-05 | End: 1900-01-01

## 2023-03-05 NOTE — PHYSICAL EXAM
[No Acute Distress] : no acute distress [No Respiratory Distress] : no respiratory distress  [Clear to Auscultation] : lungs were clear to auscultation bilaterally [Normal Rate] : normal rate  [Regular Rhythm] : with a regular rhythm [Normal S1, S2] : normal S1 and S2 [No Murmur] : no murmur heard [No Edema] : there was no peripheral edema [Soft] : abdomen soft [Non Tender] : non-tender [Normal Bowel Sounds] : normal bowel sounds [Normal] : normal gait, coordination grossly intact, no focal deficits and deep tendon reflexes were 2+ and symmetric [Normal Sclera/Conjunctiva] : normal sclera/conjunctiva [PERRL] : pupils equal round and reactive to light [EOMI] : extraocular movements intact [Normal TMs] : both tympanic membranes were normal [No JVD] : no jugular venous distention [No Lymphadenopathy] : no lymphadenopathy [No Carotid Bruits] : no carotid bruits [Pedal Pulses Present] : the pedal pulses are present [No Nipple Discharge] : no nipple discharge [No Axillary Lymphadenopathy] : no axillary lymphadenopathy [Normal Supraclavicular Nodes] : no supraclavicular lymphadenopathy [Normal Axillary Nodes] : no axillary lymphadenopathy [Normal Posterior Cervical Nodes] : no posterior cervical lymphadenopathy [No CVA Tenderness] : no CVA  tenderness [No Spinal Tenderness] : no spinal tenderness [No Joint Swelling] : no joint swelling [Grossly Normal Strength/Tone] : grossly normal strength/tone [Speech Grossly Normal] : speech grossly normal [Memory Grossly Normal] : memory grossly normal [Normal Affect] : the affect was normal [Alert and Oriented x3] : oriented to person, place, and time [Normal Insight/Judgement] : insight and judgment were intact [de-identified] : obese; healed surgical scars [de-identified] : dry patches on forehead

## 2023-03-05 NOTE — HEALTH RISK ASSESSMENT
[Very Good] : ~his/her~  mood as very good [Yes] : Yes [Monthly or less (1 pt)] : Monthly or less (1 point) [1 or 2 (0 pts)] : 1 or 2 (0 points) [Never (0 pts)] : Never (0 points) [No] : In the past 12 months have you used drugs other than those required for medical reasons? No [No falls in past year] : Patient reported no falls in the past year [Patient reported mammogram was normal] : Patient reported mammogram was normal [Patient reported PAP Smear was abnormal] : Patient reported PAP Smear was abnormal [With Family] : lives with family [Employed] : employed [] :  [Fully functional (bathing, dressing, toileting, transferring, walking, feeding)] : Fully functional (bathing, dressing, toileting, transferring, walking, feeding) [Fully functional (using the telephone, shopping, preparing meals, housekeeping, doing laundry, using] : Fully functional and needs no help or supervision to perform IADLs (using the telephone, shopping, preparing meals, housekeeping, doing laundry, using transportation, managing medications and managing finances) [Never] : Never [Patient reported colonoscopy was normal] : Patient reported colonoscopy was normal [HIV Test offered] : HIV Test offered [Hepatitis C test offered] : Hepatitis C test offered [None] : None [Audit-CScore] : 1 [de-identified] : Patient has a h/o Anxiety and Depression and is on Medication [Change in mental status noted] : No change in mental status noted [Reports changes in hearing] : Reports no changes in hearing [Reports changes in vision] : Reports no changes in vision [Reports changes in dental health] : Reports no changes in dental health [MammogramDate] : 07/22 [PapSmearDate] : 07/22 [PapSmearComments] : she has follow up with GYN [FreeTextEntry2] : Teacher [ColonoscopyDate] : 05/21

## 2023-03-05 NOTE — REVIEW OF SYSTEMS
[Anxiety] : anxiety [Depression] : depression [Heartburn] : heartburn [Swollen Glands] : swollen glands [Fever] : no fever [Pain] : no pain [Redness] : no redness [Earache] : no earache [Sore Throat] : no sore throat [Chest Pain] : no chest pain [Shortness Of Breath] : no shortness of breath [Cough] : no cough [Abdominal Pain] : no abdominal pain [Constipation] : no constipation [Diarrhea] : diarrhea [Melena] : no melena [Dysuria] : no dysuria [Vaginal Discharge] : no vaginal discharge [Joint Pain] : no joint pain [Joint Stiffness] : no joint stiffness [Skin Rash] : no skin rash [Easy Bleeding] : no easy bleeding [FreeTextEntry3] : last eye exam 12/2022 [de-identified] : she sees Dermatologist for rash on scalp; now c/o rash on face [de-identified] : h/o migraines; last attack 1 week ago.  has a different type of HA; has pain on top of head
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
## 2023-03-05 NOTE — ASSESSMENT
[FreeTextEntry1] : Patient suffers from Anxiety and Depression and was referred to a Psychiatrist and Therapist at her previous visits.\par She did not schedule any of the appointments\par Given complexity of her Mental Health Issues, patient will benefit from Medical Management as well as Psychotherapy.\par \par Today I will change Wellbutrin  mg bid to Wellbutrin  mg daily

## 2023-03-07 ENCOUNTER — NON-APPOINTMENT (OUTPATIENT)
Age: 48
End: 2023-03-07

## 2023-03-07 DIAGNOSIS — R82.90 UNSPECIFIED ABNORMAL FINDINGS IN URINE: ICD-10-CM

## 2023-03-07 DIAGNOSIS — R80.9 PROTEINURIA, UNSPECIFIED: ICD-10-CM

## 2023-03-07 LAB
25(OH)D3 SERPL-MCNC: 32.5 NG/ML
ALBUMIN SERPL ELPH-MCNC: 4.4 G/DL
ALP BLD-CCNC: 109 U/L
ALT SERPL-CCNC: 12 U/L
ANION GAP SERPL CALC-SCNC: 11 MMOL/L
APPEARANCE: ABNORMAL
AST SERPL-CCNC: 14 U/L
BACTERIA: ABNORMAL
BASOPHILS # BLD AUTO: 0.05 K/UL
BASOPHILS NFR BLD AUTO: 0.7 %
BILIRUB SERPL-MCNC: 0.4 MG/DL
BILIRUBIN URINE: NEGATIVE
BLOOD URINE: ABNORMAL
BUN SERPL-MCNC: 10 MG/DL
CALCIUM SERPL-MCNC: 9.6 MG/DL
CHLORIDE SERPL-SCNC: 105 MMOL/L
CHOLEST SERPL-MCNC: 205 MG/DL
CO2 SERPL-SCNC: 26 MMOL/L
COLOR: ABNORMAL
CREAT SERPL-MCNC: 0.75 MG/DL
EGFR: 100 ML/MIN/1.73M2
EOSINOPHIL # BLD AUTO: 0.1 K/UL
EOSINOPHIL NFR BLD AUTO: 1.5 %
ESTIMATED AVERAGE GLUCOSE: 108 MG/DL
FERRITIN SERPL-MCNC: 13 NG/ML
GLUCOSE QUALITATIVE U: NEGATIVE
GLUCOSE SERPL-MCNC: 102 MG/DL
HBA1C MFR BLD HPLC: 5.4 %
HCT VFR BLD CALC: 39.4 %
HCV AB SER QL: NONREACTIVE
HCV S/CO RATIO: 0.14 S/CO
HDLC SERPL-MCNC: 46 MG/DL
HGB BLD-MCNC: 12.1 G/DL
HIV1+2 AB SPEC QL IA.RAPID: NONREACTIVE
HYALINE CASTS: 0 /LPF
IMM GRANULOCYTES NFR BLD AUTO: 0.1 %
IRON SATN MFR SERPL: 22 %
IRON SERPL-MCNC: 83 UG/DL
KETONES URINE: NEGATIVE
LDLC SERPL CALC-MCNC: 135 MG/DL
LEUKOCYTE ESTERASE URINE: NEGATIVE
LYMPHOCYTES # BLD AUTO: 2.39 K/UL
LYMPHOCYTES NFR BLD AUTO: 35 %
MAN DIFF?: NORMAL
MCHC RBC-ENTMCNC: 27.3 PG
MCHC RBC-ENTMCNC: 30.7 GM/DL
MCV RBC AUTO: 88.9 FL
MICROSCOPIC-UA: NORMAL
MONOCYTES # BLD AUTO: 0.35 K/UL
MONOCYTES NFR BLD AUTO: 5.1 %
NEUTROPHILS # BLD AUTO: 3.92 K/UL
NEUTROPHILS NFR BLD AUTO: 57.6 %
NITRITE URINE: NEGATIVE
NONHDLC SERPL-MCNC: 159 MG/DL
PH URINE: 6
PLATELET # BLD AUTO: 333 K/UL
POTASSIUM SERPL-SCNC: 3.7 MMOL/L
PROT SERPL-MCNC: 7.3 G/DL
PROTEIN URINE: ABNORMAL
RBC # BLD: 4.43 M/UL
RBC # FLD: 12.9 %
RED BLOOD CELLS URINE: 2 /HPF
SODIUM SERPL-SCNC: 142 MMOL/L
SPECIFIC GRAVITY URINE: 1.03
SQUAMOUS EPITHELIAL CELLS: >27 /HPF
TIBC SERPL-MCNC: 372 UG/DL
TRIGL SERPL-MCNC: 117 MG/DL
TSH SERPL-ACNC: 1.17 UIU/ML
UIBC SERPL-MCNC: 289 UG/DL
UROBILINOGEN URINE: ABNORMAL
WBC # FLD AUTO: 6.82 K/UL
WHITE BLOOD CELLS URINE: 11 /HPF

## 2023-03-09 ENCOUNTER — TRANSCRIPTION ENCOUNTER (OUTPATIENT)
Age: 48
End: 2023-03-09

## 2023-03-09 DIAGNOSIS — F41.9 ANXIETY DISORDER, UNSPECIFIED: ICD-10-CM

## 2023-03-09 RX ORDER — CLONAZEPAM 0.5 MG/1
0.5 TABLET ORAL
Qty: 30 | Refills: 0 | Status: ACTIVE | COMMUNITY
Start: 2023-03-09 | End: 1900-01-01

## 2023-03-10 ENCOUNTER — TRANSCRIPTION ENCOUNTER (OUTPATIENT)
Age: 48
End: 2023-03-10

## 2023-03-10 LAB
APPEARANCE: CLEAR
BACTERIA: NEGATIVE
BILIRUBIN URINE: NEGATIVE
BLOOD URINE: NEGATIVE
COLOR: NORMAL
CREAT SPEC-SCNC: 140 MG/DL
CREAT/PROT UR: 0.1 RATIO
GLUCOSE QUALITATIVE U: NEGATIVE
HYALINE CASTS: 0 /LPF
KETONES URINE: NEGATIVE
LEUKOCYTE ESTERASE URINE: NEGATIVE
MICROSCOPIC-UA: NORMAL
NITRITE URINE: NEGATIVE
PH URINE: 6.5
PROT UR-MCNC: 13 MG/DL
PROTEIN URINE: ABNORMAL
RED BLOOD CELLS URINE: 2 /HPF
SPECIFIC GRAVITY URINE: 1.02
SQUAMOUS EPITHELIAL CELLS: 10 /HPF
UROBILINOGEN URINE: NORMAL
WHITE BLOOD CELLS URINE: 1 /HPF

## 2023-03-15 ENCOUNTER — APPOINTMENT (OUTPATIENT)
Dept: NEUROLOGY | Facility: CLINIC | Age: 48
End: 2023-03-15
Payer: COMMERCIAL

## 2023-03-15 ENCOUNTER — NON-APPOINTMENT (OUTPATIENT)
Age: 48
End: 2023-03-15

## 2023-03-15 VITALS
BODY MASS INDEX: 35.55 KG/M2 | OXYGEN SATURATION: 98 % | HEART RATE: 92 BPM | DIASTOLIC BLOOD PRESSURE: 95 MMHG | SYSTOLIC BLOOD PRESSURE: 129 MMHG | HEIGHT: 69 IN | WEIGHT: 240 LBS

## 2023-03-15 DIAGNOSIS — H53.9 UNSPECIFIED VISUAL DISTURBANCE: ICD-10-CM

## 2023-03-15 DIAGNOSIS — R51.9 HEADACHE, UNSPECIFIED: ICD-10-CM

## 2023-03-15 DIAGNOSIS — R41.9 UNSPECIFIED SYMPTOMS AND SIGNS INVOLVING COGNITIVE FUNCTIONS AND AWARENESS: ICD-10-CM

## 2023-03-15 PROCEDURE — 99203 OFFICE O/P NEW LOW 30 MIN: CPT

## 2023-03-17 PROBLEM — R51.9 NEW ONSET HEADACHE: Status: ACTIVE | Noted: 2023-03-15

## 2023-03-17 RX ORDER — AMOXICILLIN AND CLAVULANATE POTASSIUM 875; 125 MG/1; MG/1
875-125 TABLET, COATED ORAL
Qty: 14 | Refills: 0 | Status: DISCONTINUED | COMMUNITY
Start: 2022-10-20

## 2023-03-17 RX ORDER — BUPROPION HYDROCHLORIDE 150 MG/1
150 TABLET, EXTENDED RELEASE ORAL TWICE DAILY
Qty: 180 | Refills: 3 | Status: DISCONTINUED | COMMUNITY
Start: 2021-11-24 | End: 2023-03-17

## 2023-03-17 RX ORDER — IPRATROPIUM BROMIDE 21 UG/1
0.03 SPRAY NASAL
Qty: 30 | Refills: 0 | Status: DISCONTINUED | COMMUNITY
Start: 2022-10-20

## 2023-03-17 NOTE — HISTORY OF PRESENT ILLNESS
[FreeTextEntry1] : Magdalene Kumar is a 47 year old woman with a history of depression (on Wellbutrin for 1.5 years), weight loss surgery in 2014, CARLO, knee surgery, hiatal hernia, migraines, presenting for a consultation for headaches and cognitive changes. \par \par Headaches as described below are different from her typical migraines. \par Headache\par Age of onset- 23 years old \par location- usually periorbital now at crown of head\par description-pain is described as a pressure sensation. dizzy and visual changes for six months. could be together or separate for headache. saw opthalmology. \par  unilateral or bilateral - bilateral. center\par Quality-  8 out of 10 on pain scale   \par Nausea or vomiting-  none at times she has. two to three times \par Photophobia or phonophobia-  photophobia. \par warning/aura- none \par aura with typical migraines bilateral temples \par nocturnal awakening-sometimes\par snore- yes. has CPAP machine\par association with exertion or Valsalva-  none\par Duration- one hour until she takes Zomig which helps the pain\par Average #/week- three times per week. \par last ocular migraine - bilateral ocular area. two weeks ago. around menstrual cycle occurs twice per month until nasal spay. \par Association with menstrual cycle- yes  \par history of trauma-  none \par contraceptive use-IUD \par Triggers- stress. \par aggravating factors- computer use. night driving. \par  sleep- 7 hours nightly \par grind teeth- none \par neck pain- none \par rhinorrhea- none \par ptosis, lacrimation- none \par  water intake- drink 64 ounces. \par caffeine intake- 24 ounces of soda per day \par family history- dad \par medications tried for relief-  Zomig for years, Imitrex, Topamax, \par past medications tried- uses ibuprofen 600 mg 2-3 x per week. \par Cymbalta 60 mg. \par \par She also endorses memory decline over one year. She will forget why she walked into a room. Overall, feels "foggy." History of COVID X2 one episode at [place] 2021 and another November 2022. She reports getting lost while driving which is new for her. Ms. Kumar pays her bills on time. She notes she misses her medications and now has to set alarm on her phone.Her family, friends note repeating of questions. Can perform tasks at her job. Denies history of seizures, meningitis, or encephalitis.  \par  \par Social history: \par Denies smoking or alcohol use.\par \par Family history:\par Mother- hypertension \par Father- cardiac, glioblastoma open heart at 36 years old\par Siblings- migraines sister\par uncle ad father- glioblastoma \par cousin- Oro syndrome and uterine cancer\par \par The remaining neurological review of systems is negative. \par

## 2023-03-17 NOTE — CONSULT LETTER
[Dear  ___] : Dear  [unfilled], [Consult Letter:] : I had the pleasure of evaluating your patient, [unfilled]. [Please see my note below.] : Please see my note below. [Consult Closing:] : Thank you very much for allowing me to participate in the care of this patient.  If you have any questions, please do not hesitate to contact me. [Sincerely,] : Sincerely, [FreeTextEntry3] : Ricarda Garcia NLarryP.\par

## 2023-03-17 NOTE — PHYSICAL EXAM
[FreeTextEntry1] : Physical examination \par General: No acute distress, Awake, Alert.   \par  \par Mental status \par Awake, alert, and oriented to person, time and place, Normal attention span and concentration, Recent and remote memory intact, Language intact, Fund of knowledge intact.   \par MoCA 26/30 memory 2/5\par \par Cranial Nerves \par II: VFF  \par III, IV, VI: PERRL, EOMI.   \par V: Facial sensation is normal B/L.   \par VII: Facial strength is normal B/L. \par \par \par VIII: Gross hearing is intact.   \par  \par \par IX, X: Palate is midline and elevates symmetrically.   \par XI: Trapezius normal strength.   \par XII: Tongue midline without atrophy or fasciculations. \par \par Motor exam  \par Muscle tone - no evidence of rigidity or resistance in all 4 extremities.  \par No atrophy or fasciculations \par Muscle Strength: arms and legs, proximal and distal flexors and extensors are normal \par \par No UE drift.\par \par Reflexes \par All present, normal, and symmetrical.   \par \par  \par Plantars right: mute.   \par Plantars left: mute.   \par  \par \par Coordination \par Finger to nose: Normal.  \par Heel to shin: Normal.    \par \par Sensory \par Intact sensation to vibration, PP, and cold. \par \par \par Gait \par Normal including heels, toes, and tandem gait.  \par  \par

## 2023-03-17 NOTE — ASSESSMENT
[FreeTextEntry1] : Magdalene Kumar is a 47 year old woman with a history of depression (on Wellbutrin for 1.5 years), weight loss surgery in 2014, sleep apnea, knee surgery, hiatal hernia, migraines, presenting for cognitive complaints and a change in character of headaches. \par \par Change in character of headache- MRI Brain to rule out any structural lesions. \par Abortive therapy:Continue Zomig PRN. Can take with Ibuprofen. \par For preventative: she is currently on Wellbutrin. If headache frequency continues would recommend consideration of switching Wellbutrin 300 mg to Cymbalta with her PCP as this was target headache and depression. \par Magnesium glycinate 400 mg nightly. \par Vitamin B2 400 mg daily\par Increase water intake goal 2 liters per day.\par Keep headache diary. \par BP mildly elevated in office. Monitor BP at home with ambulatory BP log. \par \par Cognitive complaints\par Differential includes depression contributing to cognitive changes. \par We discussed the importance of wearing CPAP for CARLO. \par Routine and ambulatory EEG- she is reporting getting lost while driving which is new for her. \par Vitamin B12 level\par TSH \par Lyme\par Neuropsychological evaluation.  \par \par Follow up in 10 weeks.\par

## 2023-03-21 ENCOUNTER — APPOINTMENT (OUTPATIENT)
Dept: HEART AND VASCULAR | Facility: CLINIC | Age: 48
End: 2023-03-21

## 2023-04-12 ENCOUNTER — APPOINTMENT (OUTPATIENT)
Dept: NEUROLOGY | Facility: CLINIC | Age: 48
End: 2023-04-12

## 2023-04-19 ENCOUNTER — TRANSCRIPTION ENCOUNTER (OUTPATIENT)
Age: 48
End: 2023-04-19

## 2023-04-21 ENCOUNTER — APPOINTMENT (OUTPATIENT)
Dept: NEUROLOGY | Facility: CLINIC | Age: 48
End: 2023-04-21
Payer: COMMERCIAL

## 2023-04-21 PROCEDURE — 95816 EEG AWAKE AND DROWSY: CPT

## 2023-04-22 PROCEDURE — 95708 EEG WO VID EA 12-26HR UNMNTR: CPT

## 2023-04-22 PROCEDURE — 95700 EEG CONT REC W/VID EEG TECH: CPT

## 2023-04-22 PROCEDURE — 95719 EEG PHYS/QHP EA INCR W/O VID: CPT

## 2023-04-24 ENCOUNTER — APPOINTMENT (OUTPATIENT)
Dept: NEUROLOGY | Facility: CLINIC | Age: 48
End: 2023-04-24

## 2023-05-15 ENCOUNTER — APPOINTMENT (OUTPATIENT)
Dept: NEUROLOGY | Facility: CLINIC | Age: 48
End: 2023-05-15

## 2023-07-07 ENCOUNTER — APPOINTMENT (OUTPATIENT)
Dept: INTERNAL MEDICINE | Facility: CLINIC | Age: 48
End: 2023-07-07
Payer: COMMERCIAL

## 2023-07-07 VITALS
DIASTOLIC BLOOD PRESSURE: 82 MMHG | HEART RATE: 84 BPM | OXYGEN SATURATION: 98 % | HEIGHT: 69 IN | TEMPERATURE: 98.2 F | BODY MASS INDEX: 36.43 KG/M2 | SYSTOLIC BLOOD PRESSURE: 126 MMHG | RESPIRATION RATE: 16 BRPM | WEIGHT: 246 LBS

## 2023-07-07 PROCEDURE — 99213 OFFICE O/P EST LOW 20 MIN: CPT

## 2023-07-11 ENCOUNTER — TRANSCRIPTION ENCOUNTER (OUTPATIENT)
Age: 48
End: 2023-07-11

## 2023-07-14 LAB — BACTERIA UR CULT: NORMAL

## 2023-07-18 ENCOUNTER — NON-APPOINTMENT (OUTPATIENT)
Age: 48
End: 2023-07-18

## 2023-08-11 ENCOUNTER — APPOINTMENT (OUTPATIENT)
Dept: BARIATRICS | Facility: CLINIC | Age: 48
End: 2023-08-11
Payer: COMMERCIAL

## 2023-08-11 ENCOUNTER — APPOINTMENT (OUTPATIENT)
Dept: BARIATRICS | Facility: CLINIC | Age: 48
End: 2023-08-11

## 2023-08-11 PROCEDURE — 99213 OFFICE O/P EST LOW 20 MIN: CPT | Mod: 95

## 2023-08-11 NOTE — ASSESSMENT
[FreeTextEntry1] : Patient is doing well at this time. Patient has concerns about weight gain. Will prescribe a low dose of wegovy. Patient to maintain an active and healthy lifestyle and to continue a healthy diet regime. To follow up with the medical weight loss management team.

## 2023-08-11 NOTE — HISTORY OF PRESENT ILLNESS
[de-identified] : Patient arrives via telehealth for a follow up visit. Patient is doing well at this time. When last seen she had complaints of reflux. Patient now has concerns about weight gain and has recidivism of weight and gained 13 lbs. Patient has a hiatal hernia and reflux. She wants to explore all other options and review them before deciding on moving forward with surgery. Discussed with the patient about glp analogs. Due to recidivism of weight its most reasonable to start patient on glp analogs. Will prescribe a low dose of wegovy. If she can not get, can potentially look into reformulated options. Patients' most recent labs done at [facility] by Dr. Eller. Discussed with the patient to maintain an active and healthy lifestyle and to continue a healthy diet regime. Patient understands. To follow up with the medical weight loss management team.

## 2023-09-12 ENCOUNTER — APPOINTMENT (OUTPATIENT)
Dept: HEART AND VASCULAR | Facility: CLINIC | Age: 48
End: 2023-09-12
Payer: COMMERCIAL

## 2023-09-12 ENCOUNTER — NON-APPOINTMENT (OUTPATIENT)
Age: 48
End: 2023-09-12

## 2023-09-12 VITALS
OXYGEN SATURATION: 99 % | HEIGHT: 69 IN | DIASTOLIC BLOOD PRESSURE: 80 MMHG | SYSTOLIC BLOOD PRESSURE: 138 MMHG | HEART RATE: 80 BPM | BODY MASS INDEX: 36.43 KG/M2 | WEIGHT: 246 LBS

## 2023-09-12 DIAGNOSIS — R00.2 PALPITATIONS: ICD-10-CM

## 2023-09-12 PROCEDURE — 99214 OFFICE O/P EST MOD 30 MIN: CPT | Mod: 25

## 2023-09-12 PROCEDURE — 93000 ELECTROCARDIOGRAM COMPLETE: CPT

## 2023-09-12 RX ORDER — ATORVASTATIN CALCIUM 10 MG/1
10 TABLET, FILM COATED ORAL
Qty: 90 | Refills: 3 | Status: ACTIVE | COMMUNITY
Start: 2023-09-12 | End: 1900-01-01

## 2023-09-12 RX ORDER — ALBUTEROL SULFATE 90 UG/1
108 (90 BASE) INHALANT RESPIRATORY (INHALATION)
Qty: 1 | Refills: 5 | Status: DISCONTINUED | COMMUNITY
Start: 2021-12-15 | End: 2023-09-12

## 2023-09-12 RX ORDER — HYALURONATE SODIUM 20 MG/2 ML
20 SYRINGE (ML) INTRAARTICULAR
Qty: 12 | Refills: 0 | Status: DISCONTINUED | COMMUNITY
Start: 2022-12-01 | End: 2023-09-12

## 2023-09-25 NOTE — HISTORY OF PRESENT ILLNESS
Hematology-Oncology Progress Note    ASSESSMENT AND PLAN     Radhika is a 86 year old female with a past medical history of Mantle cell lymphoma (diagnosed 6/2022, excisional left neck LN biopsy with caseating granulomatous inflammation, s/p 6 cycles of Bendamustine/Rituximab and in remission 4/2023 per CT/PET scan in April 2023), CVA without residual deficits in April 2023, HTN, CKD stage 3, T2DM, HFpEF, and has recently developed a tender mass at the left forehead, who was advised to be admitted from her CAC appointment to [facility] on 9/05/23 for possible restaging of malignancy course. She is a patient of Dr. Martin.     #Mantle cell lymphoma (diagnosed 6/2022, s/p 6 cycles of Bendamustine/Rituximab and in remission 4/2023, per CT/PET scan in April 2023)  #Nodular mass at left forehead  #Hx of spontaneous tumor lysis syndrome  #Chronic generalized weakness  -Recently seen by the dermatologist for nodular mass at her forehead, per daughter, mass was biopsied and pathology results revealed a B-cell lymphoma, these results were not yet sent to [facility] and are not available to review  -CT CAP wo contrast (9/06) showed no lymphadenopathy in the chest, abdomen, or pelvis with normal spleen size  -5/5 Radiation completed     Plan:  -Patient now hospice, with plan to discharge home on 9/26.  Holding lab draws at this time.    #Pseudomonas and Enterococcus faecium UTI  #H/o recurrent UTIs  #H/o ESBL Klebsiella oxytoca UTI  #H/o of E. faecalis bacteremia  #H/o atypical findings on pulmonic valve, managed as Endocarditis  -Completed Zosyn 3.375g q8h (day 7 of 7) on 9/15    #TORIE on CKD stage 3, resolved  -Baseline Cr 1.2-1.3  -Cr 1.57 on presentation    Plan:  -Avoid nephrotoxins     #Hypovolemia, in the setting of poor PO intake  #HFpEF (TTE 6/01/23, LVEF 55%, mild AR)  #CVA without residual deficits, two areas of ischemia in right occipital region, April 2023  #Hypertension  #Hyperlipidemia  #Infrarenal AAA, without  rupture  -Holter work-up post CVA unremarkable, no evidence of Atrial fibrillation  Plan:  -Holding Atorvastatin 20mg and Aspirin 81mg nightly as patient now hospice     #Type 2 Diabetes Mellitus, controlled off medications  -HGB A1c 5.6 in April 2023     #Transaminitis, likely s/t hypovolemia-resolved  #Hypoalbuminemia, in the setting of poor PO intake  -AST 52,  on presentation  Plan:  -Holding lab draws as patient now hospice     #Right sided erythematous rash  #Lower extremity ulceration at bilateral heels  -Nystatin powder at home  -RN wound care following, appreciate eval and treat  -Right-sided rash significantly improved     #Chronic constipation  -Continue home Senokot S PRN     #Chronic back pain  #Bilateral lower extremity pressure ulcer  #Pain from unstageable sacral decubitus ulcer  -Continue home Gabapentin 300mg daily  -Continue Tylenol 650mg q6h PRN for mild-moderate pain  -Continue Oxycodone IR 5mg q4h PRN for pain  -S/p debridement by general surgery.   -RN wound care following     Checklist:  DVT Prophylaxis:  No DVT ppx   GI Prophylaxis: None indicated  F: None  E: Replace as needed  N: Dysphagia  PT/OT: signed off  Tele: No  T/L/D: PIV R antecubital 20G, PIC L forearm 20G, hoang  Dispo: 8T  Code Status: DNR  PCP: Clau Pruitt MD    Discussed with Dr. Martin. Please await attending addendum for final recommendations.    Liban Lai D.O.   Internal Medicine PGY-1  [employer] To Contact  9/25/2023      SUBJECTIVE     9/06: No acute events overnight.  She notes that she did okay overnight, she overall looks more alert than yesterday.  She denies any chest pain, shortness of breath, abdominal pain, lower extremity edema/pain.  She notes that she still has pain overlying her heels, which is very excessive.  She says that her stomach feels a little tender when you press really hard and that she is hungry.    9/07: No acute events overnight. She is sleepy this morning. She  denies any trouble overnight. Noting no chest pain or belly pain, but still having a lot of pain at her sores at the legs and feet. She denies any trouble with the hoang, noted with turbid output. She says that she was able to eat yesterday and feel like she's choking or coughing on her food.     9/08: No acute events overnight.  She said she is try to catch up on some sleep this morning, but denies any issues overnight.  She notes no chest pain or belly pain, she says that the pain that she had on admission is now gone.  She is eating okay.  She still having a lot of pain in her sores of the legs and feet, even to light touch.  She otherwise notes that things have improved over her stay.    9/09: No acute events overnight.  She says she is doing well this morning, only complaining of any pain in her lower extremities.  She says that she is eating okay, but the food is not the best.  Denies any chest pain, belly pain, shortness of breath.    9/10: Creatinine 0.75.  Albumin 1.7.  .  Hemoglobin 8.1. Platelets normal.  MRI of the lumbar spine and MRI brain with and without contrast completed September 9, 2023 did not show any evidence for spinal or intracranial metastasis.  Patient is happy to hear this.    9/11: No acute events overnight.  She is doing well, sleeping this morning, only complaining of pain in her lower extremities.  She says she is eating okay, not complaining of any chest pain, chest pressure, belly pain, shortness of breath.    9/12: No acute events overnight.  She is doing well, sleeping this morning, only complaining of pain in her lower extremities.  She says she is eating okay, not complaining of any chest pain, chest pressure, belly pain, shortness of breath. Went to finalize brain mapping today with Rad-Onc.    9/13: No acute events overnight.  She is doing well sleeping this morning, only complaining of pain in her lower extremities.  She notes that she is eating okay, not complaining  of any chest pain, shortness of breath, belly pain.  We spoke about her CODE STATUS this morning and she has elected to become DNR/DNI per conversations with family and reevaluation of her goals of care.     9/14: No acute events overnight.  She says she is doing okay this morning, she said that she is not eating too well but she still eating she is complaining of any chest pain shortness of breath or belly pain.  She is going from her radiation therapy today.    9/15: No acute events overnight.  She was doing okay this morning, notes that she does not have very much of an appetite but she is not complaining of any chest pain or shortness of breath, or belly pain.  She is going for her radiation therapy today.    9/16: No acute events overnight.  She was doing okay this morning, she does not have very much of an appetite but she is not complaining of any chest pain or shortness of breath, or belly pain.     9/18: Patient complaining of mild lower extremity pain and sacral pain relieved with repositioning. No other complaints at this time. No other pain or shortness of breath.    9/19: No acute overnight events patient asleep upon arrival to room.  She states that she has no complaints at this time, no pain or SOB. She requests to continue sleeping.    9/20: No acute overnight events.  Patient asleep upon arrival to room.  She tolerated sacral wound debridement well yesterday and has no complaints of pain at this time.  No chest pain, shortness of breath, fever, chills.    9/21: No acute overnight events.  This morning, noticed that patient had a large erythematous patchy rash along the right lateral thigh, right buttock, and right lower back.  Patient has some complaints of mild pain from her sacral wound.  No other symptoms at this time.    9/22:  No acute overnight events.  No complaints this morning.  Rash has improved and is reducing in size.  Discussion with daughter this morning about patient's condition and  prognosis, she agrees to hospice evaluation.    9/22: No acute events overnight.  No complaints this morning.  Rash continuously improving.  Plan for hospice meeting with family and patient today at 2 PM.    9/25: No acute overnight events.  No complaints this morning.  Patient accepted for home hospice.  Plan for discharge home with hospice on 9/26.    OBJECTIVE     VITAL SIGNS:  Vital Last Value 24 Hour Range   Temperature 98.2 °F (36.8 °C) (09/25/23 1309) Temp  Min: 98.2 °F (36.8 °C)  Max: 98.8 °F (37.1 °C)   Pulse 89 (09/25/23 1309) Pulse  Min: 89  Max: 94   Respiratory 18 (09/25/23 1309) Resp  Min: 16  Max: 18   Non-Invasive  Blood Pressure 138/65 (09/25/23 1309) BP  Min: 109/67  Max: 138/65   Pulse Oximetry 97 % (09/25/23 1309) SpO2  Min: 94 %  Max: 97 %       INTAKE/OUTPUT:    Intake/Output Summary (Last 24 hours) at 9/25/2023 1458  Last data filed at 9/25/2023 1400  Gross per 24 hour   Intake 120 ml   Output 1150 ml   Net -1030 ml     PHYSICAL EXAM  Vitals reviewed.   Constitutional:       General: She is not in acute distress.      Appearance: Normal appearance. She is normal weight. She is ill-appearing. She is not toxic-appearing.   HENT:      Head: Normocephalic and atraumatic.      Comments: Nodular mass, red-purple in color, over the bridge of the right eye.   Improved following radiation.     Nose: Nose normal.   Eyes:      General:         Right eye: No discharge.         Left eye: No discharge.   Cardiovascular:      Rate and Rhythm: Normal rate and regular rhythm.      Pulses: Normal pulses.      Heart sounds: Normal heart sounds.      Bilateral upper and lower extremity edema 1+  Pulmonary:      Effort: Pulmonary effort is normal. No respiratory distress.      Breath sounds: Normal breath sounds.   Abdominal:      General: Abdomen is flat. Bowel sounds are normal. There is no distension.      Palpations: Abdomen is soft. There is no mass.   Musculoskeletal:         General: Signs of pressure  [FreeTextEntry1] : 45-year-old female here for follow-up of depression. injury present. No deformity.      Cervical back: Neck supple. No rigidity.      Comments: Pressure ulcers present and dressed at bilateral lower legs, with trace edema to shins. 8 x 10 cm sacral ulcer w mild erythema at the edges, bandaged.  Skin:     General: Skin is warm and dry.      Findings: Rash present.      Comments: Mildly erythematous patchy rash along the right buttock-significantly improved since onset  Neurological:      Mental Status: She is oriented to person, place, and time.   Psychiatric:         Mood and Affect: Flat affect.     LABS  Recent Labs   Lab 09/23/23  0356 09/22/23  1006 09/21/23  0459 09/20/23  0513 09/19/23  0443   SODIUM 139 138 141 142 141   POTASSIUM 4.1 5.6* 4.3 3.9 3.9   CHLORIDE 108 110 111* 112* 111*   CO2 28 26 25 28 24   ANIONGAP 7 8 9 6* 10   BUN 19 20 20 19 20   CREATININE 0.60 0.61 0.60 0.61 0.55   CALCIUM 7.9* 7.4* 7.9* 7.9* 8.3*   GLUCOSE 107* 114* 125* 114* 111*     Recent Labs   Lab 09/23/23  0356 09/22/23  1006 09/21/23  0719 09/20/23  1944 09/20/23  0513 09/19/23  0443   HGB 8.5* 8.1* 7.8* 8.8* 6.2* 7.4*   HCT 25.8* 24.1* 23.9* 25.3* 18.5* 22.2*    241 218  --  208 266   WBC 7.9 9.0 9.5  --  9.0 11.2*   MCV 96.6 96.0 96.4  --  97.4 98.7     Recent Labs   Lab 09/23/23  0356 09/22/23  1006 09/21/23  0459 09/20/23  0513 09/19/23  0443   MG 1.5* 1.6* 2.0 1.6* 1.4*   PHOS 3.2 3.3 3.4 3.4 3.2     Recent Labs   Lab 09/18/23  1616   PT 10.9   PTT 31   INR 1.0     Recent Labs   Lab 09/23/23  0356 09/22/23  1006 09/21/23  0459 09/20/23  0513 09/19/23  0443   ALBUMIN 1.6* 1.6* 1.5* 1.5* 1.7*   AST 38* 39* 29 26 28     UA  Lab Results   Component Value Date    UWBC Large (A) 09/05/2023    URBC Small (A) 09/05/2023        IMAGING    CT RADIATION THERAPY GRIGGS   Final Result   CT scan for radiotherapy planning as described above.      Electronically Signed by: SANDRA KURTZ M.D.    Signed on: 9/11/2023 1:29 PM    Workstation ID: [id number]      MRI BRAIN W WO CONTRAST    Final Result   1. Exophytic soft tissue mass along the right frontal scalp concerning for   lymphoma. There is no MRI evidence of intracranial metastatic disease.      2. Age-appropriate cerebral and cerebellar atrophy are identified with   chronic supratentorial white matter ischemic demyelination. Additional   chronic ischemic changes are present within the crow.      Electronically Signed by: JOHN ARGUETA M.D.    Signed on: 9/9/2023 1:13 PM    Workstation ID: [id number]      MRI LUMBAR SPINE W WO CONTRAST   Final Result   1. There are no acute abnormalities and there is no MRI evidence of spine   metastasis.      2. Multilevel disc degeneration and spondylosis with multilevel disc   bulging. There are no disc herniations or levels of significant spinal   stenosis.      Electronically Signed by: JOHN ARGUETA M.D.    Signed on: 9/9/2023 1:07 PM    Workstation ID: [id number]      CT CHEST ABDOMEN PELVIS WO CONTRAST   Final Result   1.   No lymphadenopathy is identified in the chest, abdomen or pelvis. The   spleen is normal in size.   2.   New groundglass opacities in the bilateral upper lobes are nonspecific   but likely infectious or inflammatory in etiology.   3.   Small right pleural effusion.   4.   Circumferential bladder wall thickening may reflect cystitis or be   artifactual secondary to under distention. Correlate with urinalysis. There   is mild nonspecific fullness of the bilateral renal collecting system   without tahira hydronephrosis. No ureteral stones identified.   5.   Background of chronic findings as above.               Electronically Signed by: ROSINA GUADALUPE MD    Signed on: 9/6/2023 8:52 PM    Workstation ID: [id number]      XR CHEST PA OR AP 1 VIEW   Final Result   No acute cardiopulmonary process.         Electronically Signed by: LANRE SYLVESTER MD    Signed on: 9/5/2023 2:35 PM    Workstation ID: [id number]         [de-identified] : The patient reports that she is feeling dramatically improved with the sertraline.  She has much more energy and her mood is much better.  She is functioning totally normally at her job and at home.  In fact, she and her family have been doing a lot more together outside of work and she is enjoying her life much more.  She is feeling more energetic and sleeping somewhat better.\par The patient was seen by the gastroenterologist, who agrees with the need for a colonoscopy and has arrange that.  In addition, the patient reported to him that she has developed vomiting, which tends to occur twice a week or so, especially after a relatively larger meal.  Because of this, she is also going to have an endoscopy at the time of the colonoscopy.  The patient also reports increased flatulence and occasional urgency to have bowel movements.  She ingests a lot of lactose and does think that she is lactose intolerant.

## 2023-11-29 ENCOUNTER — APPOINTMENT (OUTPATIENT)
Dept: INTERNAL MEDICINE | Facility: CLINIC | Age: 48
End: 2023-11-29
Payer: COMMERCIAL

## 2023-11-29 VITALS
DIASTOLIC BLOOD PRESSURE: 80 MMHG | HEIGHT: 69 IN | WEIGHT: 246 LBS | BODY MASS INDEX: 36.43 KG/M2 | OXYGEN SATURATION: 98 % | SYSTOLIC BLOOD PRESSURE: 140 MMHG | HEART RATE: 80 BPM

## 2023-11-29 DIAGNOSIS — R21 RASH AND OTHER NONSPECIFIC SKIN ERUPTION: ICD-10-CM

## 2023-11-29 PROCEDURE — 99213 OFFICE O/P EST LOW 20 MIN: CPT

## 2023-11-29 RX ORDER — VALACYCLOVIR 1 G/1
1 TABLET, FILM COATED ORAL 3 TIMES DAILY
Qty: 21 | Refills: 0 | Status: ACTIVE | COMMUNITY
Start: 2023-11-29 | End: 1900-01-01

## 2023-11-30 ENCOUNTER — TRANSCRIPTION ENCOUNTER (OUTPATIENT)
Age: 48
End: 2023-11-30

## 2023-12-03 LAB
HSV+VZV DNA SPEC QL NAA+PROBE: NOT DETECTED
SPECIMEN SOURCE: NORMAL

## 2023-12-04 ENCOUNTER — TRANSCRIPTION ENCOUNTER (OUTPATIENT)
Age: 48
End: 2023-12-04

## 2023-12-04 ENCOUNTER — NON-APPOINTMENT (OUTPATIENT)
Age: 48
End: 2023-12-04

## 2023-12-04 DIAGNOSIS — G52.9 CRANIAL NERVE DISORDER, UNSPECIFIED: ICD-10-CM

## 2023-12-04 DIAGNOSIS — B01.89 CRANIAL NERVE DISORDER, UNSPECIFIED: ICD-10-CM

## 2023-12-04 RX ORDER — GABAPENTIN 300 MG/1
300 CAPSULE ORAL
Qty: 60 | Refills: 0 | Status: ACTIVE | COMMUNITY
Start: 2023-12-04 | End: 1900-01-01

## 2023-12-06 ENCOUNTER — APPOINTMENT (OUTPATIENT)
Dept: BARIATRICS/WEIGHT MGMT | Facility: CLINIC | Age: 48
End: 2023-12-06
Payer: COMMERCIAL

## 2023-12-06 VITALS
BODY MASS INDEX: 35.1 KG/M2 | HEART RATE: 88 BPM | HEIGHT: 69 IN | SYSTOLIC BLOOD PRESSURE: 130 MMHG | DIASTOLIC BLOOD PRESSURE: 80 MMHG | TEMPERATURE: 98.3 F | OXYGEN SATURATION: 99 % | WEIGHT: 237 LBS

## 2023-12-06 PROCEDURE — 99204 OFFICE O/P NEW MOD 45 MIN: CPT

## 2023-12-07 ENCOUNTER — TRANSCRIPTION ENCOUNTER (OUTPATIENT)
Age: 48
End: 2023-12-07

## 2023-12-07 PROBLEM — R21 RASH: Status: ACTIVE | Noted: 2023-11-29

## 2023-12-08 ENCOUNTER — TRANSCRIPTION ENCOUNTER (OUTPATIENT)
Age: 48
End: 2023-12-08

## 2023-12-09 ENCOUNTER — TRANSCRIPTION ENCOUNTER (OUTPATIENT)
Age: 48
End: 2023-12-09

## 2023-12-12 ENCOUNTER — APPOINTMENT (OUTPATIENT)
Dept: HEART AND VASCULAR | Facility: CLINIC | Age: 48
End: 2023-12-12

## 2023-12-14 ENCOUNTER — NON-APPOINTMENT (OUTPATIENT)
Age: 48
End: 2023-12-14

## 2023-12-18 ENCOUNTER — APPOINTMENT (OUTPATIENT)
Dept: GASTROENTEROLOGY | Facility: CLINIC | Age: 48
End: 2023-12-18
Payer: COMMERCIAL

## 2023-12-18 ENCOUNTER — APPOINTMENT (OUTPATIENT)
Dept: BARIATRICS/WEIGHT MGMT | Facility: CLINIC | Age: 48
End: 2023-12-18
Payer: COMMERCIAL

## 2023-12-18 VITALS
BODY MASS INDEX: 33.97 KG/M2 | WEIGHT: 230 LBS | DIASTOLIC BLOOD PRESSURE: 80 MMHG | SYSTOLIC BLOOD PRESSURE: 124 MMHG | HEART RATE: 82 BPM | OXYGEN SATURATION: 98 %

## 2023-12-18 DIAGNOSIS — Z91.89 OTHER SPECIFIED PERSONAL RISK FACTORS, NOT ELSEWHERE CLASSIFIED: ICD-10-CM

## 2023-12-18 DIAGNOSIS — K59.09 OTHER CONSTIPATION: ICD-10-CM

## 2023-12-18 DIAGNOSIS — K44.9 DIAPHRAGMATIC HERNIA W/OUT OBSTRUCTION OR GANGRENE: ICD-10-CM

## 2023-12-18 DIAGNOSIS — R10.11 RIGHT UPPER QUADRANT PAIN: ICD-10-CM

## 2023-12-18 PROCEDURE — 99215 OFFICE O/P EST HI 40 MIN: CPT

## 2023-12-18 PROCEDURE — 99213 OFFICE O/P EST LOW 20 MIN: CPT | Mod: 95

## 2023-12-18 NOTE — HISTORY OF PRESENT ILLNESS
[FreeTextEntry1] : 48F PMH class II obesity (s/p sleeve gastrectomy, 2014), HLD, low HDL, prediabetes, CARLO, NAFLD, anxiety/depression, migraines, GERD, Vit D deficiency, who presents to weight management for follow-up.  She initially presented 12/2023 reporting that she had struggled with her weight since childhood, underwent gastric sleeve in 2014 with weight loss from 276 lbs to a amrita of 200 lbs with weight recidivism in the setting of life stressors and sedentary lifestyle. She has also been on bupropion for the past 1-2 years, has taken topiramate in the past for migraines without noted weight loss or migraine effect. Of note, she took Fen Phen, and phentermine, in the distant past. Sedentary lifestyle. Untreated CARLO.  We discussed dietary and lifestyle interventions. We discussed medical interventions, she was prescribed zepbound (rejected), wegovy (out of stock).  Weight today:  Weight at Initial Visit (12/6/23): 237 lbs, BMI 35  Medication: Has not been able to get medication due to supply shortages or lack of coverage  Exercise: No formal exercise, on her feet at work.  Sleep: She has CARLO, cannot tolerate CPAP.

## 2023-12-18 NOTE — PHYSICAL EXAM

## 2023-12-18 NOTE — ASSESSMENT
[FreeTextEntry1] : 48F PMH class II obesity (s/p sleeve gastrectomy, 2014), HLD, low HDL, prediabetes, CARLO, NAFLD, anxiety/depression, migraines, GERD, Vit D deficiency, who presents to weight management for follow-up.  # Class II obesity s/p sleeve gastrectomy (2014) w/metabolic syndrome (central obesity, prediabetes, low HDL), c/b HLD, NAFLD: Weight 237 lbs, BMI 35, has been unable to start Wegovy due to dose supply shortages, or Zepbound (PA was rejected). Sedentary lifestyle. Untreated CARLO.  - Food log - Meal planning/prep - Dietician referral - Exercise goals discussed, activity advisor referral - Discussed medical interventions in depth. Will titrate up semaglutide with click adjustments, to Wegovy 1.7 mg/wk - F/u in 1-2 month

## 2023-12-18 NOTE — PHYSICAL EXAM
[Obese, well nourished, in no acute distress] : obese, well nourished, in no acute distress [de-identified] : TEB visit

## 2023-12-18 NOTE — HISTORY OF PRESENT ILLNESS
[de-identified] : 05/2021 ( Dr. Samson)-- acute esophagitis, large hiatal hernia, neg for Trent's, neg for H. pylori  [FreeTextEntry1] : 05/2021 ( Dr. Samson)-- nonbleeding IH only.

## 2023-12-18 NOTE — ASSESSMENT
[FreeTextEntry1] : 1. New RX for Nexium sent. Continue Twice daily given that her symptoms are improving on once daily. 2. C/W weight loss through diet and exercise as tolerated  3. Will reorder esophagram to further evaluate her hiatal hernia as per patient.   # RUQ Abdominal pain  1. Abdominal US to further evaluate this region given RUQ tenderness during examination    # constipation  1. Reviewed dietary and lifestyle modifications  2. Encouraged High fiber diet (goal of 20-30 grams fiber/day) 3. Adequate water (40+ ounces/day) 4. Encouraged Aerobic exercise 30 mins/day as tolerated. 5. Daily MiraLAX up to twice a day for the next 5 days than daily as needed.  6. Stimulant/bulk laxatives as needed: Metamucil Vs. Benefiter Vs. Citrucel; dosing discussed in length.  7. Reinforced importance of paying close attention to body's signals to have BM; Ignoring body's signals to have a bowel movement causes signals to become weaker over time. 8. Stress management/reduction  9. Keep a food diary log for the next 14 days.  10. Medical therapy to be considered if symptoms remain significant after lifestyle changes ( low dose antidepressants, anticholinergics, alternative medications such as aloe, peppermint oil ect.)

## 2023-12-19 ENCOUNTER — NON-APPOINTMENT (OUTPATIENT)
Age: 48
End: 2023-12-19

## 2023-12-19 ENCOUNTER — TRANSCRIPTION ENCOUNTER (OUTPATIENT)
Age: 48
End: 2023-12-19

## 2023-12-19 ENCOUNTER — APPOINTMENT (OUTPATIENT)
Dept: HEART AND VASCULAR | Facility: CLINIC | Age: 48
End: 2023-12-19
Payer: COMMERCIAL

## 2023-12-19 VITALS
SYSTOLIC BLOOD PRESSURE: 132 MMHG | HEART RATE: 79 BPM | DIASTOLIC BLOOD PRESSURE: 98 MMHG | OXYGEN SATURATION: 95 % | WEIGHT: 238 LBS | BODY MASS INDEX: 35.15 KG/M2

## 2023-12-19 DIAGNOSIS — R07.89 OTHER CHEST PAIN: ICD-10-CM

## 2023-12-19 PROCEDURE — 99214 OFFICE O/P EST MOD 30 MIN: CPT | Mod: 25

## 2023-12-19 PROCEDURE — 93000 ELECTROCARDIOGRAM COMPLETE: CPT

## 2023-12-19 RX ORDER — ESOMEPRAZOLE MAGNESIUM 20 MG/1
20 CAPSULE, DELAYED RELEASE ORAL TWICE DAILY
Qty: 180 | Refills: 1 | Status: ACTIVE | COMMUNITY
Start: 2023-12-18 | End: 1900-01-01

## 2023-12-19 RX ORDER — AMLODIPINE BESYLATE 5 MG/1
5 TABLET ORAL
Qty: 90 | Refills: 3 | Status: ACTIVE | COMMUNITY
Start: 2023-12-19 | End: 1900-01-01

## 2023-12-19 NOTE — ASSESSMENT
[FreeTextEntry1] : 47 yo F  Palpitations/Brief SVT on 72 hr Holter HTN - borderline off meds HLD -  Obesity - Pre DM - Fatty Liver Strong Fhx CAD  EKG today NSR Carotid US Aug 2022: no plaque PFTs (feb 2022) - borderline restrictive ventilatory impairment with a normal diffusion capacity - consistent with dx of obesity. TST (Jan 2021) - Normal test - mild chest pain - no arrhythmias - normal ST segment response Echo (Jan 2021) - EF 67% - normal LV size, function and wall thickness.  - recent ER visit reviewed, ruled out for ACS, d-dimer normal range, CXR clear,, very atypical chest pain (non exertional). likely related to the shingles,  Will update ECHO (low suspicion primary cardiac etiology).   - BPs have been persistently elevated - recommend starting norvasc 5mg  - continue lipitor 10mg for HLD, elevated ASCVD risk. LFTs normal. Following with GI for fatty liver.  March 2023.  Update lipid panel and LFTs.   - counseled on dietary and exercise programs to help reduce her CV risk. Needs to increase her aerobic activity. Counseled on LE compression therapy to help with fatigue at end of day, it is hurting her ability to exercise after working on her feet all day. - rare/brief svt which coincides with her palps. not significantly bothersome. will monitor for now. no medical therapy. discussed if palps continue and her BP is elevated on home logs we will trial a beta blocker.

## 2023-12-19 NOTE — HISTORY OF PRESENT ILLNESS
[FreeTextEntry1] : 48F obesity, prior HTN which resolved with weight loss, fatty liver, HLD  Is extremely active, walks on average 8k steps.  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   Says she has an occasional "flutter" a couple times a week and lasts  < 5 seconds.   No exertional cp or sob.  Drinks a lot of soda and notes this is her vice.  Avg around 40 oz a soda per day  22:  started walking more since last visit, still drinking same amount of soda.  Holter with rare/brief svt that coincided with her palpitations.  not bothersome.    23:  has had very hard time losing weight.   no cp/sob.  occasional brief palpitation.    23:  since last visit had strep throat/shingles, has been feeling very low energy.  also had some chest pain, random in onset, non exertional, feels like "spasm" lasts few seconds and goes away on its own. ruled out for acs  at ER.  she thinks it is anxiety related.  bp logs have been in high range 130s/90s consistently.  tolerating statin.  Non smoker No EtOH use Fhx: Grandfather  CAD 50s, Father CAD/CABG 40s

## 2023-12-19 NOTE — REASON FOR VISIT
[Follow-Up - Clinic] : a clinic follow-up of [Follow-Up Visit] : a follow-up visit for [FreeTextEntry2] : borderline HTN - cough with chest pains,

## 2023-12-27 ENCOUNTER — RESULT REVIEW (OUTPATIENT)
Age: 48
End: 2023-12-27

## 2023-12-29 ENCOUNTER — TRANSCRIPTION ENCOUNTER (OUTPATIENT)
Age: 48
End: 2023-12-29

## 2024-01-02 ENCOUNTER — TRANSCRIPTION ENCOUNTER (OUTPATIENT)
Age: 49
End: 2024-01-02

## 2024-01-02 RX ORDER — SEMAGLUTIDE 0.25 MG/.5ML
0.25 INJECTION, SOLUTION SUBCUTANEOUS
Qty: 1 | Refills: 1 | Status: DISCONTINUED | COMMUNITY
Start: 2023-12-06 | End: 2024-01-02

## 2024-01-08 ENCOUNTER — TRANSCRIPTION ENCOUNTER (OUTPATIENT)
Age: 49
End: 2024-01-08

## 2024-01-10 ENCOUNTER — RESULT REVIEW (OUTPATIENT)
Age: 49
End: 2024-01-10

## 2024-01-11 ENCOUNTER — TRANSCRIPTION ENCOUNTER (OUTPATIENT)
Age: 49
End: 2024-01-11

## 2024-01-22 DIAGNOSIS — K21.9 GASTRO-ESOPHAGEAL REFLUX DISEASE W/OUT ESOPHAGITIS: ICD-10-CM

## 2024-01-22 RX ORDER — FAMOTIDINE 20 MG/1
20 TABLET, FILM COATED ORAL
Qty: 180 | Refills: 3 | Status: ACTIVE | COMMUNITY
Start: 2024-01-22 | End: 1900-01-01

## 2024-01-23 ENCOUNTER — NON-APPOINTMENT (OUTPATIENT)
Age: 49
End: 2024-01-23

## 2024-01-25 RX ORDER — ZOLMITRIPTAN 5 MG/1
5 SPRAY NASAL
Qty: 2 | Refills: 0 | Status: ACTIVE | COMMUNITY
Start: 1900-01-01 | End: 1900-01-01

## 2024-01-26 ENCOUNTER — TRANSCRIPTION ENCOUNTER (OUTPATIENT)
Age: 49
End: 2024-01-26

## 2024-02-15 ENCOUNTER — TRANSCRIPTION ENCOUNTER (OUTPATIENT)
Age: 49
End: 2024-02-15

## 2024-02-15 RX ORDER — PEN NEEDLE, DIABETIC 32 GX 1/6"
32G X 4 MM NEEDLE, DISPOSABLE MISCELLANEOUS
Qty: 1 | Refills: 0 | Status: DISCONTINUED | COMMUNITY
Start: 2023-12-18 | End: 2024-02-15

## 2024-02-15 RX ORDER — SEMAGLUTIDE 2.68 MG/ML
8 INJECTION, SOLUTION SUBCUTANEOUS
Qty: 1 | Refills: 1 | Status: DISCONTINUED | COMMUNITY
Start: 2023-12-18 | End: 2024-02-15

## 2024-02-16 ENCOUNTER — TRANSCRIPTION ENCOUNTER (OUTPATIENT)
Age: 49
End: 2024-02-16

## 2024-02-22 ENCOUNTER — TRANSCRIPTION ENCOUNTER (OUTPATIENT)
Age: 49
End: 2024-02-22

## 2024-02-22 ENCOUNTER — APPOINTMENT (OUTPATIENT)
Dept: CARDIOLOGY | Facility: CLINIC | Age: 49
End: 2024-02-22
Payer: COMMERCIAL

## 2024-02-22 PROCEDURE — 36415 COLL VENOUS BLD VENIPUNCTURE: CPT

## 2024-02-22 PROCEDURE — 93306 TTE W/DOPPLER COMPLETE: CPT

## 2024-02-23 LAB
CHOLEST SERPL-MCNC: 142 MG/DL
HDLC SERPL-MCNC: 51 MG/DL
LDLC SERPL CALC-MCNC: 74 MG/DL
NONHDLC SERPL-MCNC: 91 MG/DL
TRIGL SERPL-MCNC: 89 MG/DL

## 2024-03-11 ENCOUNTER — TRANSCRIPTION ENCOUNTER (OUTPATIENT)
Age: 49
End: 2024-03-11

## 2024-03-14 ENCOUNTER — RESULT REVIEW (OUTPATIENT)
Age: 49
End: 2024-03-14

## 2024-03-15 ENCOUNTER — RESULT REVIEW (OUTPATIENT)
Age: 49
End: 2024-03-15

## 2024-03-15 ENCOUNTER — APPOINTMENT (OUTPATIENT)
Dept: GASTROENTEROLOGY | Facility: HOSPITAL | Age: 49
End: 2024-03-15

## 2024-03-16 ENCOUNTER — NON-APPOINTMENT (OUTPATIENT)
Age: 49
End: 2024-03-16

## 2024-03-25 ENCOUNTER — APPOINTMENT (OUTPATIENT)
Dept: FAMILY MEDICINE | Facility: CLINIC | Age: 49
End: 2024-03-25

## 2024-03-29 ENCOUNTER — APPOINTMENT (OUTPATIENT)
Dept: BARIATRICS/WEIGHT MGMT | Facility: CLINIC | Age: 49
End: 2024-03-29
Payer: COMMERCIAL

## 2024-03-29 VITALS — WEIGHT: 226.1 LBS | HEIGHT: 69 IN | BODY MASS INDEX: 33.49 KG/M2

## 2024-03-29 PROCEDURE — G2211 COMPLEX E/M VISIT ADD ON: CPT | Mod: NC,1L

## 2024-03-29 PROCEDURE — 99214 OFFICE O/P EST MOD 30 MIN: CPT

## 2024-03-29 RX ORDER — TIRZEPATIDE 2.5 MG/.5ML
2.5 INJECTION, SOLUTION SUBCUTANEOUS
Qty: 1 | Refills: 2 | Status: DISCONTINUED | COMMUNITY
Start: 2023-12-07 | End: 2024-03-29

## 2024-03-29 RX ORDER — SEMAGLUTIDE 1 MG/.5ML
1 INJECTION, SOLUTION SUBCUTANEOUS
Qty: 1 | Refills: 1 | Status: DISCONTINUED | COMMUNITY
Start: 2023-08-11 | End: 2024-03-29

## 2024-03-29 RX ORDER — ONDANSETRON 4 MG/1
4 TABLET ORAL
Qty: 28 | Refills: 1 | Status: ACTIVE | COMMUNITY
Start: 2024-03-29 | End: 1900-01-01

## 2024-04-01 NOTE — ASSESSMENT
[FreeTextEntry1] : 48F PMH class II obesity (s/p sleeve gastrectomy, 2014), HLD, low HDL, prediabetes, CARLO, NAFLD, anxiety/depression, migraines, GERD, Vit D deficiency, who presents to weight management for follow-up.  # Class II obesity s/p sleeve gastrectomy (2014) w/metabolic syndrome (central obesity, prediabetes, low HDL), c/b HLD, NAFLD: Weight  226.4 lbs, BMI 33.39, down 10.6 lbs since initial visit. Now on Wegovy 1 mg/wk without adverse effect except some intermittent nausea. Still sedentary. CARLO untreated. - Food log - Meal planning/prep - Bariatric dietician referral - Exercise goals discussed, activity advisor referral - C/w 1 mg this month, then another month of 1 mg, then Wegovy 1.7 mg/wk - Tx comorbidities via weight loss, diet, exercise, lifestyle as discussed.  - F/u in 2-3 month

## 2024-04-01 NOTE — PHYSICAL EXAM
[Obese, well nourished, in no acute distress] : obese, well nourished, in no acute distress [de-identified] : TEB visit

## 2024-04-01 NOTE — HISTORY OF PRESENT ILLNESS
[FreeTextEntry1] : 48F PMH class II obesity (s/p sleeve gastrectomy, 2014), HLD, low HDL, prediabetes, CARLO, NAFLD, anxiety/depression, migraines, GERD, Vit D deficiency, who presents to weight management for follow-up.  She initially presented 12/2023 reporting that she had struggled with her weight since childhood, underwent gastric sleeve in 2014 with weight loss from 276 lbs to a amrita of 200 lbs with weight recidivism in the setting of life stressors and sedentary lifestyle. She has also been on bupropion for the past 1-2 years, has taken topiramate in the past for migraines without noted weight loss or migraine effect. Of note, she took Fen Phen, and phentermine, in the distant past. Sedentary lifestyle. Untreated CARLO.  We discussed dietary and lifestyle interventions. We discussed medical interventions, she was prescribed zepbound (rejected), wegovy (out of stock).  Weight today: 226.4 lbs, BMI 33.39 Weight last visit (12/18/23): x Weight at Initial Visit (12/6/23): 237 lbs, BMI 35  Medication: She has started 1 week into 1 mg of Wegovy.  Exercise: No formal exercise, on her feet at work.  Sleep: She has CARLO, cannot tolerate CPAP.

## 2024-04-06 ENCOUNTER — NON-APPOINTMENT (OUTPATIENT)
Age: 49
End: 2024-04-06

## 2024-04-19 ENCOUNTER — TRANSCRIPTION ENCOUNTER (OUTPATIENT)
Age: 49
End: 2024-04-19

## 2024-06-10 NOTE — HISTORY OF PRESENT ILLNESS
[Home] : at home, [unfilled] , at the time of the visit. [Medical Office: ([facility])___] : at the medical office located in  [Verbal consent obtained from patient] : the patient, [unfilled] [FreeTextEntry1] : 49F PMH class II obesity (s/p sleeve gastrectomy, 2014), HLD, low HDL, prediabetes, CARLO, NAFLD, anxiety/depression, migraines, GERD, Vit D deficiency, who presents to weight management for follow-up.  She initially presented 12/2023 reporting that she had struggled with her weight since childhood, underwent gastric sleeve in 2014 with weight loss from 276 lbs to a amrita of 200 lbs with weight recidivism in the setting of life stressors and sedentary lifestyle. She has also been on bupropion for the past 1-2 years, has taken topiramate in the past for migraines without noted weight loss or migraine effect. Of note, she took Fen Phen, and phentermine, in the distant past. Sedentary lifestyle. Untreated CARLO.  We discussed dietary and lifestyle interventions. We discussed medical interventions, she was prescribed zepbound (rejected), wegovy (out of stock).  Weight today:  Weight last visit (3/29/24): 226.4 lbs, BMI 33.39 Weight at Initial Visit (12/6/23): 237 lbs, BMI 35  Medication:  (She has started 1 week into 1 mg of Wegovy.  Exercise:  (No formal exercise, on her feet at work.  Sleep:  (She has CARLO, cannot tolerate CPAP.

## 2024-06-10 NOTE — PHYSICAL EXAM
[Obese, well nourished, in no acute distress] : obese, well nourished, in no acute distress [de-identified] : TEB visit

## 2024-06-10 NOTE — ASSESSMENT
[FreeTextEntry1] : 49F PMH class II obesity (s/p sleeve gastrectomy, 2014), HLD, low HDL, prediabetes, CARLO, NAFLD, anxiety/depression, migraines, GERD, Vit D deficiency, who presents to weight management for follow-up.  # Class II obesity s/p sleeve gastrectomy (2014) w/metabolic syndrome (central obesity, prediabetes, low HDL), c/b HLD, NAFLD: Weight    226.4 lbs, BMI 33.39, down 10.6 lbs since initial visit. Now on Wegovy 1 mg/wk without adverse effect except some intermittent nausea. Still sedentary. CARLO untreated. - Food log - Meal planning/prep - Bariatric dietician referral - Exercise goals discussed, activity advisor referral - C/w 1 mg this month, then another month of 1 mg, then Wegovy 1.7 mg/wk - Tx comorbidities via weight loss, diet, exercise, lifestyle as discussed.  - F/u in 2-3 month

## 2024-06-11 ENCOUNTER — APPOINTMENT (OUTPATIENT)
Dept: BARIATRICS/WEIGHT MGMT | Facility: CLINIC | Age: 49
End: 2024-06-11
Payer: COMMERCIAL

## 2024-06-24 PROBLEM — G43.909 MIGRAINE: Status: ACTIVE | Noted: 2020-11-25

## 2024-06-24 PROBLEM — E78.5 HYPERLIPIDEMIA: Status: ACTIVE | Noted: 2020-11-27

## 2024-06-24 PROBLEM — R74.8 ELEVATED LIVER ENZYMES: Status: ACTIVE | Noted: 2022-07-05

## 2024-06-24 PROBLEM — E55.9 VITAMIN D DEFICIENCY: Status: ACTIVE | Noted: 2020-11-27

## 2024-06-24 PROBLEM — E88.810 METABOLIC SYNDROME: Status: ACTIVE | Noted: 2023-12-06

## 2024-06-24 PROBLEM — R73.03 PREDIABETES: Status: ACTIVE | Noted: 2021-11-25

## 2024-06-24 PROBLEM — I10 BENIGN ESSENTIAL HYPERTENSION: Status: ACTIVE | Noted: 2023-12-19

## 2024-06-24 PROBLEM — K76.0 FATTY LIVER: Status: ACTIVE | Noted: 2022-08-11

## 2024-06-24 PROBLEM — R03.0 ELEVATED BLOOD PRESSURE READING WITHOUT DIAGNOSIS OF HYPERTENSION: Status: ACTIVE | Noted: 2020-11-25

## 2024-06-24 PROBLEM — G47.30 SLEEP APNEA: Status: ACTIVE | Noted: 2021-11-24

## 2024-06-24 PROBLEM — E66.9 OBESITY (BMI 30-39.9): Status: ACTIVE | Noted: 2020-11-25

## 2024-06-24 PROBLEM — R74.8 LOW SERUM HDL: Status: ACTIVE | Noted: 2021-11-25

## 2024-06-24 PROBLEM — E66.01 CLASS 2 SEVERE OBESITY WITH SERIOUS COMORBIDITY IN ADULT: Status: ACTIVE | Noted: 2023-12-06

## 2024-06-25 ENCOUNTER — APPOINTMENT (OUTPATIENT)
Dept: BARIATRICS/WEIGHT MGMT | Facility: CLINIC | Age: 49
End: 2024-06-25
Payer: COMMERCIAL

## 2024-06-25 VITALS — WEIGHT: 200.2 LBS | HEIGHT: 69 IN | BODY MASS INDEX: 29.65 KG/M2

## 2024-06-25 DIAGNOSIS — G47.30 SLEEP APNEA, UNSPECIFIED: ICD-10-CM

## 2024-06-25 DIAGNOSIS — E78.5 HYPERLIPIDEMIA, UNSPECIFIED: ICD-10-CM

## 2024-06-25 DIAGNOSIS — R03.0 ELEVATED BLOOD-PRESSURE READING, W/OUT DIAGNOSIS OF HYPERTENSION: ICD-10-CM

## 2024-06-25 DIAGNOSIS — E66.01 MORBID (SEVERE) OBESITY DUE TO EXCESS CALORIES: ICD-10-CM

## 2024-06-25 DIAGNOSIS — I10 ESSENTIAL (PRIMARY) HYPERTENSION: ICD-10-CM

## 2024-06-25 DIAGNOSIS — R73.03 PREDIABETES.: ICD-10-CM

## 2024-06-25 DIAGNOSIS — E88.810 METABOLIC SYNDROME: ICD-10-CM

## 2024-06-25 DIAGNOSIS — G43.909 MIGRAINE, UNSPECIFIED, NOT INTRACTABLE, W/OUT STATUS MIGRAINOSUS: ICD-10-CM

## 2024-06-25 DIAGNOSIS — R74.8 ABNORMAL LEVELS OF OTHER SERUM ENZYMES: ICD-10-CM

## 2024-06-25 DIAGNOSIS — K76.0 FATTY (CHANGE OF) LIVER, NOT ELSEWHERE CLASSIFIED: ICD-10-CM

## 2024-06-25 DIAGNOSIS — E66.9 OBESITY, UNSPECIFIED: ICD-10-CM

## 2024-06-25 DIAGNOSIS — E55.9 VITAMIN D DEFICIENCY, UNSPECIFIED: ICD-10-CM

## 2024-06-25 PROCEDURE — G2211 COMPLEX E/M VISIT ADD ON: CPT | Mod: NC

## 2024-06-25 PROCEDURE — 99214 OFFICE O/P EST MOD 30 MIN: CPT

## 2024-06-25 RX ORDER — SEMAGLUTIDE 1.7 MG/.75ML
1.7 INJECTION, SOLUTION SUBCUTANEOUS
Qty: 1 | Refills: 1 | Status: ACTIVE | COMMUNITY
Start: 2023-12-18 | End: 1900-01-01

## 2024-06-25 RX ORDER — SEMAGLUTIDE 2.4 MG/.75ML
2.4 INJECTION, SOLUTION SUBCUTANEOUS
Qty: 3 | Refills: 0 | Status: ACTIVE | COMMUNITY
Start: 2024-06-25 | End: 1900-01-01

## 2024-06-25 RX ORDER — SEMAGLUTIDE 0.25 MG/.5ML
0.25 INJECTION, SOLUTION SUBCUTANEOUS
Qty: 2 | Refills: 0 | Status: DISCONTINUED | COMMUNITY
Start: 2024-02-16 | End: 2024-06-25

## 2024-07-22 ENCOUNTER — NON-APPOINTMENT (OUTPATIENT)
Age: 49
End: 2024-07-22

## 2024-07-29 ENCOUNTER — APPOINTMENT (OUTPATIENT)
Dept: BARIATRICS/WEIGHT MGMT | Facility: CLINIC | Age: 49
End: 2024-07-29
Payer: COMMERCIAL

## 2024-07-29 VITALS — BODY MASS INDEX: 28.32 KG/M2 | HEIGHT: 69 IN | WEIGHT: 191.2 LBS

## 2024-07-29 DIAGNOSIS — E55.9 VITAMIN D DEFICIENCY, UNSPECIFIED: ICD-10-CM

## 2024-07-29 DIAGNOSIS — E78.5 HYPERLIPIDEMIA, UNSPECIFIED: ICD-10-CM

## 2024-07-29 DIAGNOSIS — R74.8 ABNORMAL LEVELS OF OTHER SERUM ENZYMES: ICD-10-CM

## 2024-07-29 DIAGNOSIS — G47.30 SLEEP APNEA, UNSPECIFIED: ICD-10-CM

## 2024-07-29 DIAGNOSIS — I10 ESSENTIAL (PRIMARY) HYPERTENSION: ICD-10-CM

## 2024-07-29 DIAGNOSIS — E88.810 METABOLIC SYNDROME: ICD-10-CM

## 2024-07-29 DIAGNOSIS — K76.0 FATTY (CHANGE OF) LIVER, NOT ELSEWHERE CLASSIFIED: ICD-10-CM

## 2024-07-29 DIAGNOSIS — R73.03 PREDIABETES.: ICD-10-CM

## 2024-07-29 DIAGNOSIS — E66.01 MORBID (SEVERE) OBESITY DUE TO EXCESS CALORIES: ICD-10-CM

## 2024-07-29 DIAGNOSIS — G43.909 MIGRAINE, UNSPECIFIED, NOT INTRACTABLE, W/OUT STATUS MIGRAINOSUS: ICD-10-CM

## 2024-07-29 DIAGNOSIS — R19.7 DIARRHEA, UNSPECIFIED: ICD-10-CM

## 2024-07-29 DIAGNOSIS — R03.0 ELEVATED BLOOD-PRESSURE READING, W/OUT DIAGNOSIS OF HYPERTENSION: ICD-10-CM

## 2024-07-29 PROCEDURE — 99214 OFFICE O/P EST MOD 30 MIN: CPT

## 2024-07-29 PROCEDURE — G2211 COMPLEX E/M VISIT ADD ON: CPT | Mod: NC

## 2024-07-29 NOTE — HISTORY OF PRESENT ILLNESS
[Home] : at home, [unfilled] , at the time of the visit. [Medical Office: ([facility])___] : at the medical office located in  [Verbal consent obtained from patient] : the patient, [unfilled] [FreeTextEntry1] : 49F PMH class II obesity (s/p sleeve gastrectomy, 2014), HLD, low HDL, prediabetes, CARLO, NAFLD, anxiety/depression, migraines, GERD, Vit D deficiency, who presents to weight management for follow-up.  She initially presented 12/2023 reporting that she had struggled with her weight since childhood, underwent gastric sleeve in 2014 with weight loss from 276 lbs to a amrita of 200 lbs with weight recidivism in the setting of life stressors and sedentary lifestyle. She has also been on bupropion for the past 1-2 years, has taken topiramate in the past for migraines without noted weight loss or migraine effect. Of note, she took Fen Phen, and phentermine, in the distant past. Sedentary lifestyle. Untreated CARLO.  We discussed dietary and lifestyle interventions. We discussed medical interventions, she was prescribed zepbound (rejected), wegovy (out of stock).  Weight today: 191.2 lbs, BMI 28.24 Weight last visit (6/25/24): 200.2 lbs, BMI 29.56 Weight at Initial Visit (12/6/23): 237 lbs, BMI 35  She comes in for the last 5 days nausea, gets stomach cramps, with diarrhea, triggered quickly after she eats something.  Currently taking antibiotics, for UTI, amoxicillin, last day today.   Medication: She has been on Wegovy 1.7 mg/wk, no side effects up to this point.  Exercise:  ('doesn't eat a lot', has a cheese stick in the AM bc not hungry. Has a protein shake most days. Lots of fruit, craving. Some vegetables. Three meals, very small dinner.   (Walking a lot, on her feet at work, 10k steps per day. Trying Pilates class.  Sleep:  (She has CARLO, cannot tolerate CPAP.

## 2024-07-29 NOTE — ASSESSMENT
[FreeTextEntry1] : 49F PMH class II obesity (s/p sleeve gastrectomy, 2014), HLD, low HDL, prediabetes, CARLO, NAFLD, anxiety/depression, migraines, GERD, Vit D deficiency, who presents to weight management for follow-up.  # Class II obesity s/p sleeve gastrectomy (2014) w/metabolic syndrome (central obesity, prediabetes, low HDL), c/b HLD, NAFLD: Weight 191.2 lbs, BMI 28.24, down 9 lbs since last visit 5 weeks ago, I suspect much of this is water-weight from lack of intake due to nausea and diarrhea. She has been on Wegovy for some time, and on 1.7 mg dose for several months without side effects, so her side effects are not likely solely due to wegovy. She is on her last day of Amoxicillin for UTI, which has resolved, and this may be the cause. Symptoms/cramping are generalized, not characteristic of GB/panc disease. Otherwise, eating well. On her feet more, starting Pilates soon. CARLO treat via weight loss. - Food log - Meal planning/prep - Bariatric dietician referral - Exercise goals discussed, activity advisor referral - C/w Wegovy 1.7 mg/wk - Tx comorbidities via weight loss, diet, exercise, lifestyle as discussed.  - F/u in ~3 month  # Diarrhea: as above, suspect due to antibiotics - Discussed maintaining hydration and electrolyte intake - Light foods - May hold next dose of wegovy for a few days if symptoms persist, she will call in if so - Finish abx today - Start probiotic - If symptoms persist or severity increases, seek medical testing (ie at ED... c. diff, stool testing, ?imaging). - May take zofran for nausea.

## 2024-07-29 NOTE — PHYSICAL EXAM
[Obese, well nourished, in no acute distress] : obese, well nourished, in no acute distress [de-identified] : TEB visit

## 2024-09-19 NOTE — PHYSICAL EXAM
[Obese, well nourished, in no acute distress] : obese, well nourished, in no acute distress [de-identified] : TEB visit

## 2024-09-19 NOTE — HISTORY OF PRESENT ILLNESS
[FreeTextEntry1] : 49F PMH class II obesity (s/p sleeve gastrectomy, 2014), HLD, low HDL, prediabetes, CARLO, NAFLD, anxiety/depression, migraines, GERD, Vit D deficiency, who presents to weight management for follow-up.  She initially presented 12/2023 reporting that she had struggled with her weight since childhood, underwent gastric sleeve in 2014 with weight loss from 276 lbs to a amrita of 200 lbs with weight recidivism in the setting of life stressors and sedentary lifestyle. She has also been on bupropion for the past 1-2 years, has taken topiramate in the past for migraines without noted weight loss or migraine effect. Of note, she took Fen Phen, and phentermine, in the distant past. Sedentary lifestyle. Untreated CARLO.  We discussed dietary and lifestyle interventions. We discussed medical interventions, she was prescribed zepbound (rejected), wegovy (out of stock).  Weight today:  Weight last visit (7/29/24): 191.2 lbs, BMI 28.24 Weight (6/25/24): 200.2 lbs, BMI 29.56 Weight at Initial Visit (12/6/23): 237 lbs, BMI 35   (She comes in for the last 5 days nausea, gets stomach cramps, with diarrhea, triggered quickly after she eats something.  Currently taking antibiotics, for UTI, amoxicillin, last day today.   Medication:  (She has been on Wegovy 1.7 mg/wk, no side effects up to this point.  Exercise:  ('doesn't eat a lot', has a cheese stick in the AM bc not hungry. Has a protein shake most days. Lots of fruit, craving. Some vegetables. Three meals, very small dinner.   (Walking a lot, on her feet at work, 10k steps per day. Trying Pilates class.  Sleep:  (She has CARLO, cannot tolerate CPAP. [Home] : at home, [unfilled] , at the time of the visit. [Medical Office: ([facility])___] : at the medical office located in  [Verbal consent obtained from patient] : the patient, [unfilled]

## 2024-09-19 NOTE — HISTORY OF PRESENT ILLNESS
[FreeTextEntry1] : 49F PMH class II obesity (s/p sleeve gastrectomy, 2014), HLD, low HDL, prediabetes, CARLO, NAFLD, anxiety/depression, migraines, GERD, Vit D deficiency, who presents to weight management for follow-up.  She initially presented 12/2023 reporting that she had struggled with her weight since childhood, underwent gastric sleeve in 2014 with weight loss from 276 lbs to a amrita of 200 lbs with weight recidivism in the setting of life stressors and sedentary lifestyle. She has also been on bupropion for the past 1-2 years, has taken topiramate in the past for migraines without noted weight loss or migraine effect. Of note, she took Fen Phen, and phentermine, in the distant past. Sedentary lifestyle. Untreated CARLO.  We discussed dietary and lifestyle interventions. We discussed medical interventions, she was prescribed zepbound (rejected), wegovy (out of stock).  Weight today:  Weight last visit (7/29/24): 191.2 lbs, BMI 28.24 Weight (6/25/24): 200.2 lbs, BMI 29.56 Weight at Initial Visit (12/6/23): 237 lbs, BMI 35   (She comes in for the last 5 days nausea, gets stomach cramps, with diarrhea, triggered quickly after she eats something.  Currently taking antibiotics, for UTI, amoxicillin, last day today.   Medication:  (She has been on Wegovy 1.7 mg/wk, no side effects up to this point.  Exercise:  ('doesn't eat a lot', has a cheese stick in the AM bc not hungry. Has a protein shake most days. Lots of fruit, craving. Some vegetables. Three meals, very small dinner.   (Walking a lot, on her feet at work, 10k steps per day. Trying Pilates class.  Sleep:  (She has CARLO, cannot tolerate CPAP. [Home] : at home, [unfilled] , at the time of the visit. [Medical Office: ([facility])___] : at the medical office located in  [Verbal consent obtained from patient] : the patient, [unfilled]

## 2024-09-19 NOTE — PHYSICAL EXAM
[Obese, well nourished, in no acute distress] : obese, well nourished, in no acute distress [de-identified] : TEB visit

## 2024-09-19 NOTE — REVIEW OF SYSTEMS
Quality 130: Documentation Of Current Medications In The Medical Record: Current Medications Documented
[Negative] : Allergic/Immunologic
[Negative] : Allergic/Immunologic
Quality 431: Preventive Care And Screening: Unhealthy Alcohol Use - Screening: Patient identified as an unhealthy alcohol user when screened for unhealthy alcohol use using a systematic screening method and received brief counseling
Quality 111:Pneumonia Vaccination Status For Older Adults: Pneumococcal Vaccination not Administered or Previously Received, Reason not Otherwise Specified
Quality 402: Tobacco Use And Help With Quitting Among Adolescents: Patient screened for tobacco and never smoked
Quality 110: Preventive Care And Screening: Influenza Immunization: Influenza Immunization not Administered for Documented Reasons.
Detail Level: Detailed

## 2024-09-20 ENCOUNTER — APPOINTMENT (OUTPATIENT)
Dept: BARIATRICS/WEIGHT MGMT | Facility: CLINIC | Age: 49
End: 2024-09-20
Payer: COMMERCIAL

## 2024-09-20 VITALS — BODY MASS INDEX: 26.9 KG/M2 | WEIGHT: 181.6 LBS | HEIGHT: 69 IN

## 2024-09-20 DIAGNOSIS — G47.30 SLEEP APNEA, UNSPECIFIED: ICD-10-CM

## 2024-09-20 DIAGNOSIS — R73.03 PREDIABETES.: ICD-10-CM

## 2024-09-20 DIAGNOSIS — I10 ESSENTIAL (PRIMARY) HYPERTENSION: ICD-10-CM

## 2024-09-20 DIAGNOSIS — E88.810 METABOLIC SYNDROME: ICD-10-CM

## 2024-09-20 DIAGNOSIS — K76.0 FATTY (CHANGE OF) LIVER, NOT ELSEWHERE CLASSIFIED: ICD-10-CM

## 2024-09-20 DIAGNOSIS — E66.9 OBESITY, UNSPECIFIED: ICD-10-CM

## 2024-09-20 DIAGNOSIS — R74.8 ABNORMAL LEVELS OF OTHER SERUM ENZYMES: ICD-10-CM

## 2024-09-20 DIAGNOSIS — E66.01 MORBID (SEVERE) OBESITY DUE TO EXCESS CALORIES: ICD-10-CM

## 2024-09-20 DIAGNOSIS — E78.5 HYPERLIPIDEMIA, UNSPECIFIED: ICD-10-CM

## 2024-09-20 PROCEDURE — G2211 COMPLEX E/M VISIT ADD ON: CPT | Mod: NC

## 2024-09-20 PROCEDURE — 99214 OFFICE O/P EST MOD 30 MIN: CPT

## 2024-09-25 ENCOUNTER — RX RENEWAL (OUTPATIENT)
Age: 49
End: 2024-09-25

## 2024-10-12 ENCOUNTER — NON-APPOINTMENT (OUTPATIENT)
Age: 49
End: 2024-10-12

## 2024-10-15 ENCOUNTER — TRANSCRIPTION ENCOUNTER (OUTPATIENT)
Age: 49
End: 2024-10-15

## 2024-10-16 ENCOUNTER — NON-APPOINTMENT (OUTPATIENT)
Age: 49
End: 2024-10-16

## 2024-10-18 ENCOUNTER — APPOINTMENT (OUTPATIENT)
Dept: HEART AND VASCULAR | Facility: CLINIC | Age: 49
End: 2024-10-18
Payer: COMMERCIAL

## 2024-10-18 ENCOUNTER — NON-APPOINTMENT (OUTPATIENT)
Age: 49
End: 2024-10-18

## 2024-10-18 VITALS
SYSTOLIC BLOOD PRESSURE: 105 MMHG | WEIGHT: 175 LBS | DIASTOLIC BLOOD PRESSURE: 62 MMHG | OXYGEN SATURATION: 98 % | BODY MASS INDEX: 25.84 KG/M2 | HEART RATE: 94 BPM

## 2024-10-18 DIAGNOSIS — I48.91 UNSPECIFIED ATRIAL FIBRILLATION: ICD-10-CM

## 2024-10-18 DIAGNOSIS — K76.0 FATTY (CHANGE OF) LIVER, NOT ELSEWHERE CLASSIFIED: ICD-10-CM

## 2024-10-18 DIAGNOSIS — E78.5 HYPERLIPIDEMIA, UNSPECIFIED: ICD-10-CM

## 2024-10-18 DIAGNOSIS — G47.30 SLEEP APNEA, UNSPECIFIED: ICD-10-CM

## 2024-10-18 DIAGNOSIS — R00.2 PALPITATIONS: ICD-10-CM

## 2024-10-18 DIAGNOSIS — R73.03 PREDIABETES.: ICD-10-CM

## 2024-10-18 DIAGNOSIS — I10 ESSENTIAL (PRIMARY) HYPERTENSION: ICD-10-CM

## 2024-10-18 PROCEDURE — 93000 ELECTROCARDIOGRAM COMPLETE: CPT

## 2024-10-18 PROCEDURE — G2211 COMPLEX E/M VISIT ADD ON: CPT | Mod: NC

## 2024-10-18 PROCEDURE — 99496 TRANSJ CARE MGMT HIGH F2F 7D: CPT

## 2024-10-18 PROCEDURE — 99215 OFFICE O/P EST HI 40 MIN: CPT

## 2024-10-18 RX ORDER — DILTIAZEM HYDROCHLORIDE 120 MG/1
120 TABLET, EXTENDED RELEASE ORAL
Refills: 0 | Status: ACTIVE | COMMUNITY
Start: 2024-10-18

## 2024-10-18 RX ORDER — ATENOLOL 50 MG/1
50 TABLET ORAL DAILY
Refills: 0 | Status: ACTIVE | COMMUNITY
Start: 2024-10-18

## 2024-10-23 ENCOUNTER — TRANSCRIPTION ENCOUNTER (OUTPATIENT)
Age: 49
End: 2024-10-23

## 2024-10-25 ENCOUNTER — TRANSCRIPTION ENCOUNTER (OUTPATIENT)
Age: 49
End: 2024-10-25

## 2024-10-29 ENCOUNTER — TRANSCRIPTION ENCOUNTER (OUTPATIENT)
Age: 49
End: 2024-10-29

## 2024-10-30 ENCOUNTER — RESULT REVIEW (OUTPATIENT)
Age: 49
End: 2024-10-30

## 2024-10-30 RX ORDER — ASPIRIN ENTERIC COATED TABLETS 81 MG 81 MG/1
81 TABLET, DELAYED RELEASE ORAL DAILY
Refills: 0 | Status: ACTIVE | COMMUNITY
Start: 2024-10-30

## 2024-10-30 RX ORDER — APIXABAN 5 MG/1
5 TABLET, FILM COATED ORAL
Qty: 90 | Refills: 3 | Status: DISCONTINUED | COMMUNITY
Start: 2024-10-23 | End: 2024-10-30

## 2024-11-14 RX ORDER — DILTIAZEM HYDROCHLORIDE 120 MG/1
120 CAPSULE, EXTENDED RELEASE ORAL DAILY
Qty: 30 | Refills: 3 | Status: ACTIVE | COMMUNITY
Start: 2024-11-14 | End: 1900-01-01

## 2024-11-26 ENCOUNTER — APPOINTMENT (OUTPATIENT)
Dept: HEART AND VASCULAR | Facility: CLINIC | Age: 49
End: 2024-11-26

## 2024-12-02 ENCOUNTER — RX RENEWAL (OUTPATIENT)
Age: 49
End: 2024-12-02

## 2024-12-06 ENCOUNTER — APPOINTMENT (OUTPATIENT)
Dept: BARIATRICS/WEIGHT MGMT | Facility: CLINIC | Age: 49
End: 2024-12-06
Payer: COMMERCIAL

## 2024-12-06 VITALS — BODY MASS INDEX: 25.51 KG/M2 | HEIGHT: 69 IN | WEIGHT: 172.2 LBS

## 2024-12-06 DIAGNOSIS — I10 ESSENTIAL (PRIMARY) HYPERTENSION: ICD-10-CM

## 2024-12-06 PROCEDURE — 99214 OFFICE O/P EST MOD 30 MIN: CPT

## 2024-12-06 PROCEDURE — G2211 COMPLEX E/M VISIT ADD ON: CPT | Mod: NC

## 2024-12-06 RX ORDER — SEMAGLUTIDE 1.7 MG/.75ML
1.7 INJECTION, SOLUTION SUBCUTANEOUS
Qty: 9 | Refills: 0 | Status: ACTIVE | COMMUNITY
Start: 2024-12-02 | End: 1900-01-01

## 2025-01-28 ENCOUNTER — APPOINTMENT (OUTPATIENT)
Dept: PULMONOLOGY | Facility: CLINIC | Age: 50
End: 2025-01-28
Payer: COMMERCIAL

## 2025-01-28 VITALS
HEART RATE: 75 BPM | DIASTOLIC BLOOD PRESSURE: 70 MMHG | WEIGHT: 172 LBS | SYSTOLIC BLOOD PRESSURE: 110 MMHG | OXYGEN SATURATION: 99 % | HEIGHT: 69 IN | BODY MASS INDEX: 25.48 KG/M2

## 2025-01-28 DIAGNOSIS — I48.91 UNSPECIFIED ATRIAL FIBRILLATION: ICD-10-CM

## 2025-01-28 DIAGNOSIS — G47.19 OTHER HYPERSOMNIA: ICD-10-CM

## 2025-01-28 DIAGNOSIS — G47.30 SLEEP APNEA, UNSPECIFIED: ICD-10-CM

## 2025-01-28 PROCEDURE — 99203 OFFICE O/P NEW LOW 30 MIN: CPT

## 2025-01-28 RX ORDER — DULOXETINE HYDROCHLORIDE 30 MG/1
CAPSULE, DELAYED RELEASE ORAL
Refills: 0 | Status: ACTIVE | COMMUNITY

## 2025-02-27 PROBLEM — E66.812 CLASS 2 SEVERE OBESITY WITH SERIOUS COMORBIDITY IN ADULT: Status: ACTIVE | Noted: 2023-12-06

## 2025-02-28 ENCOUNTER — APPOINTMENT (OUTPATIENT)
Dept: BARIATRICS/WEIGHT MGMT | Facility: CLINIC | Age: 50
End: 2025-02-28
Payer: COMMERCIAL

## 2025-02-28 VITALS — WEIGHT: 174 LBS | BODY MASS INDEX: 25.77 KG/M2 | HEIGHT: 69 IN

## 2025-02-28 DIAGNOSIS — E88.810 METABOLIC SYNDROME: ICD-10-CM

## 2025-02-28 DIAGNOSIS — E78.5 HYPERLIPIDEMIA, UNSPECIFIED: ICD-10-CM

## 2025-02-28 DIAGNOSIS — R73.03 PREDIABETES.: ICD-10-CM

## 2025-02-28 DIAGNOSIS — E66.9 OBESITY, UNSPECIFIED: ICD-10-CM

## 2025-02-28 DIAGNOSIS — I48.91 UNSPECIFIED ATRIAL FIBRILLATION: ICD-10-CM

## 2025-02-28 DIAGNOSIS — E66.01 OBESITY, CLASS 2: ICD-10-CM

## 2025-02-28 DIAGNOSIS — I10 ESSENTIAL (PRIMARY) HYPERTENSION: ICD-10-CM

## 2025-02-28 DIAGNOSIS — G47.30 SLEEP APNEA, UNSPECIFIED: ICD-10-CM

## 2025-02-28 DIAGNOSIS — R53.83 OTHER FATIGUE: ICD-10-CM

## 2025-02-28 DIAGNOSIS — Z00.00 ENCOUNTER FOR GENERAL ADULT MEDICAL EXAMINATION W/OUT ABNORMAL FINDINGS: ICD-10-CM

## 2025-02-28 DIAGNOSIS — E55.9 VITAMIN D DEFICIENCY, UNSPECIFIED: ICD-10-CM

## 2025-02-28 DIAGNOSIS — R74.8 ABNORMAL LEVELS OF OTHER SERUM ENZYMES: ICD-10-CM

## 2025-02-28 DIAGNOSIS — U07.1 COVID-19: ICD-10-CM

## 2025-02-28 DIAGNOSIS — E66.812 OBESITY, CLASS 2: ICD-10-CM

## 2025-02-28 DIAGNOSIS — K76.0 FATTY (CHANGE OF) LIVER, NOT ELSEWHERE CLASSIFIED: ICD-10-CM

## 2025-02-28 PROCEDURE — 99214 OFFICE O/P EST MOD 30 MIN: CPT | Mod: 95

## 2025-06-11 ENCOUNTER — APPOINTMENT (OUTPATIENT)
Dept: BARIATRICS/WEIGHT MGMT | Facility: CLINIC | Age: 50
End: 2025-06-11

## 2025-06-12 ENCOUNTER — RX RENEWAL (OUTPATIENT)
Age: 50
End: 2025-06-12

## 2025-06-17 ENCOUNTER — APPOINTMENT (OUTPATIENT)
Dept: BARIATRICS/WEIGHT MGMT | Facility: CLINIC | Age: 50
End: 2025-06-17
Payer: COMMERCIAL

## 2025-06-17 VITALS — WEIGHT: 176 LBS | HEIGHT: 69 IN | BODY MASS INDEX: 26.07 KG/M2

## 2025-06-17 PROCEDURE — 99214 OFFICE O/P EST MOD 30 MIN: CPT | Mod: 95

## 2025-06-17 PROCEDURE — G2211 COMPLEX E/M VISIT ADD ON: CPT | Mod: NC,95

## 2025-08-12 ENCOUNTER — APPOINTMENT (OUTPATIENT)
Dept: BARIATRICS/WEIGHT MGMT | Facility: CLINIC | Age: 50
End: 2025-08-12

## 2025-08-12 VITALS — HEIGHT: 69 IN | WEIGHT: 180 LBS | BODY MASS INDEX: 26.66 KG/M2

## 2025-08-12 DIAGNOSIS — E66.01 OBESITY, CLASS 2: ICD-10-CM

## 2025-08-12 DIAGNOSIS — K76.0 FATTY (CHANGE OF) LIVER, NOT ELSEWHERE CLASSIFIED: ICD-10-CM

## 2025-08-12 DIAGNOSIS — E88.810 METABOLIC SYNDROME: ICD-10-CM

## 2025-08-12 DIAGNOSIS — E78.5 HYPERLIPIDEMIA, UNSPECIFIED: ICD-10-CM

## 2025-08-12 DIAGNOSIS — R53.83 OTHER FATIGUE: ICD-10-CM

## 2025-08-12 DIAGNOSIS — R73.03 PREDIABETES.: ICD-10-CM

## 2025-08-12 DIAGNOSIS — E66.812 OBESITY, CLASS 2: ICD-10-CM

## 2025-08-12 DIAGNOSIS — R74.8 ABNORMAL LEVELS OF OTHER SERUM ENZYMES: ICD-10-CM

## 2025-08-12 DIAGNOSIS — I10 ESSENTIAL (PRIMARY) HYPERTENSION: ICD-10-CM

## 2025-08-12 DIAGNOSIS — E66.9 OBESITY, UNSPECIFIED: ICD-10-CM

## 2025-08-12 DIAGNOSIS — G47.30 SLEEP APNEA, UNSPECIFIED: ICD-10-CM

## 2025-08-12 PROCEDURE — 99214 OFFICE O/P EST MOD 30 MIN: CPT | Mod: 95

## 2025-08-12 PROCEDURE — G2211 COMPLEX E/M VISIT ADD ON: CPT | Mod: NC,95
